# Patient Record
Sex: FEMALE | URBAN - METROPOLITAN AREA
[De-identification: names, ages, dates, MRNs, and addresses within clinical notes are randomized per-mention and may not be internally consistent; named-entity substitution may affect disease eponyms.]

---

## 2020-03-27 ENCOUNTER — EMERGENCY (EMERGENCY)
Facility: HOSPITAL | Age: 45
LOS: 1 days | End: 2020-03-27
Attending: EMERGENCY MEDICINE
Payer: SELF-PAY

## 2020-03-27 VITALS
HEIGHT: 65 IN | OXYGEN SATURATION: 100 % | SYSTOLIC BLOOD PRESSURE: 154 MMHG | RESPIRATION RATE: 18 BRPM | DIASTOLIC BLOOD PRESSURE: 87 MMHG | TEMPERATURE: 98 F | WEIGHT: 195.11 LBS | HEART RATE: 78 BPM

## 2020-03-27 PROCEDURE — L9991: CPT

## 2020-03-27 RX ORDER — SODIUM CHLORIDE 9 MG/ML
1000 INJECTION INTRAMUSCULAR; INTRAVENOUS; SUBCUTANEOUS
Refills: 0 | Status: DISCONTINUED | OUTPATIENT
Start: 2020-03-27 | End: 2020-03-31

## 2020-03-27 RX ORDER — SODIUM CHLORIDE 9 MG/ML
1000 INJECTION INTRAMUSCULAR; INTRAVENOUS; SUBCUTANEOUS ONCE
Refills: 0 | Status: DISCONTINUED | OUTPATIENT
Start: 2020-03-27 | End: 2020-03-31

## 2020-03-27 NOTE — ED ADULT TRIAGE NOTE - IDEAL BODY WEIGHT(KG)
Was the patient seen in the last year in this department? Yes Last OV 10/2018    Does patient have an active prescription for medications requested? Yes    Received Request Via: Pharmacy   57

## 2023-02-24 LAB
ALANINE AMINOTRANSFERASE (SGPT) (U/L) IN SER/PLAS: 12 U/L (ref 7–45)
ALBUMIN (G/DL) IN SER/PLAS: 3.8 G/DL (ref 3.4–5)
ALKALINE PHOSPHATASE (U/L) IN SER/PLAS: 73 U/L (ref 33–110)
ANION GAP IN SER/PLAS: 10 MMOL/L (ref 10–20)
ASPARTATE AMINOTRANSFERASE (SGOT) (U/L) IN SER/PLAS: 13 U/L (ref 9–39)
BASOPHILS (10*3/UL) IN BLOOD BY AUTOMATED COUNT: 0.05 X10E9/L (ref 0–0.1)
BASOPHILS/100 LEUKOCYTES IN BLOOD BY AUTOMATED COUNT: 0.5 % (ref 0–2)
BILIRUBIN TOTAL (MG/DL) IN SER/PLAS: 0.5 MG/DL (ref 0–1.2)
CALCIUM (MG/DL) IN SER/PLAS: 8.7 MG/DL (ref 8.6–10.3)
CARBON DIOXIDE, TOTAL (MMOL/L) IN SER/PLAS: 32 MMOL/L (ref 21–32)
CHLORIDE (MMOL/L) IN SER/PLAS: 100 MMOL/L (ref 98–107)
CHOLESTEROL (MG/DL) IN SER/PLAS: 174 MG/DL (ref 0–199)
CHOLESTEROL IN HDL (MG/DL) IN SER/PLAS: 50 MG/DL
CHOLESTEROL/HDL RATIO: 3.5
CREATININE (MG/DL) IN SER/PLAS: 0.9 MG/DL (ref 0.5–1.05)
EOSINOPHILS (10*3/UL) IN BLOOD BY AUTOMATED COUNT: 0.08 X10E9/L (ref 0–0.7)
EOSINOPHILS/100 LEUKOCYTES IN BLOOD BY AUTOMATED COUNT: 0.8 % (ref 0–6)
ERYTHROCYTE DISTRIBUTION WIDTH (RATIO) BY AUTOMATED COUNT: 16.7 % (ref 11.5–14.5)
ERYTHROCYTE MEAN CORPUSCULAR HEMOGLOBIN CONCENTRATION (G/DL) BY AUTOMATED: 29.8 G/DL (ref 32–36)
ERYTHROCYTE MEAN CORPUSCULAR VOLUME (FL) BY AUTOMATED COUNT: 84 FL (ref 80–100)
ERYTHROCYTES (10*6/UL) IN BLOOD BY AUTOMATED COUNT: 4.78 X10E12/L (ref 4–5.2)
ESTIMATED AVERAGE GLUCOSE FOR HBA1C: 120 MG/DL
GFR FEMALE: 80 ML/MIN/1.73M2
GLUCOSE (MG/DL) IN SER/PLAS: 93 MG/DL (ref 74–99)
HEMATOCRIT (%) IN BLOOD BY AUTOMATED COUNT: 40 % (ref 36–46)
HEMOGLOBIN (G/DL) IN BLOOD: 11.9 G/DL (ref 12–16)
HEMOGLOBIN A1C/HEMOGLOBIN TOTAL IN BLOOD: 5.8 %
IMMATURE GRANULOCYTES/100 LEUKOCYTES IN BLOOD BY AUTOMATED COUNT: 0.4 % (ref 0–0.9)
LDL: 106 MG/DL (ref 0–99)
LEUKOCYTES (10*3/UL) IN BLOOD BY AUTOMATED COUNT: 10.2 X10E9/L (ref 4.4–11.3)
LYMPHOCYTES (10*3/UL) IN BLOOD BY AUTOMATED COUNT: 3.91 X10E9/L (ref 1.2–4.8)
LYMPHOCYTES/100 LEUKOCYTES IN BLOOD BY AUTOMATED COUNT: 38.2 % (ref 13–44)
MONOCYTES (10*3/UL) IN BLOOD BY AUTOMATED COUNT: 0.57 X10E9/L (ref 0.1–1)
MONOCYTES/100 LEUKOCYTES IN BLOOD BY AUTOMATED COUNT: 5.6 % (ref 2–10)
NEUTROPHILS (10*3/UL) IN BLOOD BY AUTOMATED COUNT: 5.58 X10E9/L (ref 1.2–7.7)
NEUTROPHILS/100 LEUKOCYTES IN BLOOD BY AUTOMATED COUNT: 54.5 % (ref 40–80)
PLATELETS (10*3/UL) IN BLOOD AUTOMATED COUNT: 461 X10E9/L (ref 150–450)
POTASSIUM (MMOL/L) IN SER/PLAS: 3.7 MMOL/L (ref 3.5–5.3)
PROTEIN TOTAL: 7.3 G/DL (ref 6.4–8.2)
SODIUM (MMOL/L) IN SER/PLAS: 138 MMOL/L (ref 136–145)
THYROTROPIN (MIU/L) IN SER/PLAS BY DETECTION LIMIT <= 0.05 MIU/L: 1.33 MIU/L (ref 0.44–3.98)
TRIGLYCERIDE (MG/DL) IN SER/PLAS: 91 MG/DL (ref 0–149)
URATE (MG/DL) IN SER/PLAS: 5.7 MG/DL (ref 2.3–6.7)
UREA NITROGEN (MG/DL) IN SER/PLAS: 15 MG/DL (ref 6–23)
VLDL: 18 MG/DL (ref 0–40)

## 2023-03-03 PROBLEM — M25.571 ARTHRALGIA OF RIGHT FOOT: Status: ACTIVE | Noted: 2023-03-03

## 2023-03-03 PROBLEM — E66.813 CLASS 3 SEVERE OBESITY DUE TO EXCESS CALORIES WITH BODY MASS INDEX (BMI) OF 50.0 TO 59.9 IN ADULT: Status: ACTIVE | Noted: 2023-03-03

## 2023-03-03 PROBLEM — R51.9 HEADACHE: Status: ACTIVE | Noted: 2023-03-03

## 2023-03-03 PROBLEM — M72.2 PLANTAR FASCIITIS: Status: ACTIVE | Noted: 2023-03-03

## 2023-03-03 PROBLEM — R41.3 MEMORY LOSS: Status: ACTIVE | Noted: 2023-03-03

## 2023-03-03 PROBLEM — Z86.69 HISTORY OF CHIARI MALFORMATION: Status: ACTIVE | Noted: 2023-03-03

## 2023-03-03 PROBLEM — R53.83 FATIGUE: Status: ACTIVE | Noted: 2023-03-03

## 2023-03-03 PROBLEM — M77.30 HEEL SPUR: Status: ACTIVE | Noted: 2023-03-03

## 2023-03-03 PROBLEM — R60.9 EDEMA: Status: ACTIVE | Noted: 2023-03-03

## 2023-03-03 PROBLEM — R73.03 PREDIABETES: Status: ACTIVE | Noted: 2023-03-03

## 2023-03-03 PROBLEM — D64.9 ANEMIA: Status: ACTIVE | Noted: 2023-03-03

## 2023-03-03 PROBLEM — R07.89 ATYPICAL CHEST PAIN: Status: ACTIVE | Noted: 2023-03-03

## 2023-03-03 PROBLEM — R93.1 ELEVATED CORONARY ARTERY CALCIUM SCORE: Status: ACTIVE | Noted: 2023-03-03

## 2023-03-03 PROBLEM — E78.5 HYPERLIPIDEMIA LDL GOAL <70: Status: ACTIVE | Noted: 2023-03-03

## 2023-03-03 PROBLEM — F40.240 CLAUSTROPHOBIA: Status: ACTIVE | Noted: 2023-03-03

## 2023-03-03 PROBLEM — E23.7 PITUITARY ABNORMALITY (MULTI): Status: ACTIVE | Noted: 2023-03-03

## 2023-03-03 PROBLEM — R29.2 ABNORMAL REFLEXES: Status: ACTIVE | Noted: 2023-03-03

## 2023-03-03 PROBLEM — N92.0 HEAVY MENSTRUAL BLEEDING: Status: ACTIVE | Noted: 2023-03-03

## 2023-03-03 PROBLEM — E66.01 CLASS 3 SEVERE OBESITY DUE TO EXCESS CALORIES WITH BODY MASS INDEX (BMI) OF 50.0 TO 59.9 IN ADULT (MULTI): Status: ACTIVE | Noted: 2023-03-03

## 2023-03-03 RX ORDER — FUROSEMIDE 40 MG/1
TABLET ORAL
COMMUNITY
End: 2023-03-13 | Stop reason: SDUPTHER

## 2023-03-03 RX ORDER — ATORVASTATIN CALCIUM 20 MG/1
20 TABLET, FILM COATED ORAL NIGHTLY
COMMUNITY
End: 2024-03-01 | Stop reason: SDUPTHER

## 2023-03-13 DIAGNOSIS — R60.9 EDEMA, UNSPECIFIED TYPE: Primary | ICD-10-CM

## 2023-03-14 RX ORDER — FUROSEMIDE 40 MG/1
40 TABLET ORAL DAILY
Qty: 90 TABLET | Refills: 1 | Status: SHIPPED | OUTPATIENT
Start: 2023-03-14 | End: 2023-12-01

## 2023-03-17 ENCOUNTER — APPOINTMENT (OUTPATIENT)
Dept: PRIMARY CARE | Facility: CLINIC | Age: 48
End: 2023-03-17

## 2023-04-24 ENCOUNTER — APPOINTMENT (OUTPATIENT)
Dept: PRIMARY CARE | Facility: CLINIC | Age: 48
End: 2023-04-24

## 2023-05-12 ENCOUNTER — APPOINTMENT (OUTPATIENT)
Dept: PRIMARY CARE | Facility: CLINIC | Age: 48
End: 2023-05-12

## 2023-05-26 ENCOUNTER — APPOINTMENT (OUTPATIENT)
Dept: PRIMARY CARE | Facility: CLINIC | Age: 48
End: 2023-05-26

## 2023-11-28 DIAGNOSIS — R60.9 EDEMA, UNSPECIFIED TYPE: ICD-10-CM

## 2023-12-01 RX ORDER — FUROSEMIDE 40 MG/1
TABLET ORAL
Qty: 90 TABLET | Refills: 0 | Status: SHIPPED | OUTPATIENT
Start: 2023-12-01 | End: 2024-03-01 | Stop reason: SDUPTHER

## 2024-02-16 ENCOUNTER — OFFICE VISIT (OUTPATIENT)
Dept: PRIMARY CARE | Facility: CLINIC | Age: 49
End: 2024-02-16
Payer: COMMERCIAL

## 2024-02-16 VITALS
HEIGHT: 70 IN | BODY MASS INDEX: 41.95 KG/M2 | SYSTOLIC BLOOD PRESSURE: 126 MMHG | OXYGEN SATURATION: 98 % | HEART RATE: 72 BPM | DIASTOLIC BLOOD PRESSURE: 78 MMHG | WEIGHT: 293 LBS

## 2024-02-16 DIAGNOSIS — E23.7 PITUITARY ABNORMALITY (MULTI): ICD-10-CM

## 2024-02-16 DIAGNOSIS — R53.83 OTHER FATIGUE: Primary | ICD-10-CM

## 2024-02-16 DIAGNOSIS — M72.2 PLANTAR FASCIITIS: ICD-10-CM

## 2024-02-16 DIAGNOSIS — E78.5 HYPERLIPIDEMIA LDL GOAL <70: ICD-10-CM

## 2024-02-16 DIAGNOSIS — E66.01 CLASS 3 SEVERE OBESITY DUE TO EXCESS CALORIES WITH SERIOUS COMORBIDITY AND BODY MASS INDEX (BMI) OF 50.0 TO 59.9 IN ADULT (MULTI): ICD-10-CM

## 2024-02-16 DIAGNOSIS — R73.03 PREDIABETES: ICD-10-CM

## 2024-02-16 PROBLEM — F40.240 CLAUSTROPHOBIA: Status: RESOLVED | Noted: 2023-03-03 | Resolved: 2024-02-16

## 2024-02-16 PROBLEM — R41.3 MEMORY LOSS: Status: RESOLVED | Noted: 2023-03-03 | Resolved: 2024-02-16

## 2024-02-16 PROBLEM — R07.89 ATYPICAL CHEST PAIN: Status: RESOLVED | Noted: 2023-03-03 | Resolved: 2024-02-16

## 2024-02-16 PROBLEM — R51.9 HEADACHE: Status: RESOLVED | Noted: 2023-03-03 | Resolved: 2024-02-16

## 2024-02-16 PROBLEM — D64.9 ANEMIA: Status: RESOLVED | Noted: 2023-03-03 | Resolved: 2024-02-16

## 2024-02-16 PROCEDURE — 99214 OFFICE O/P EST MOD 30 MIN: CPT | Performed by: INTERNAL MEDICINE

## 2024-02-16 PROCEDURE — 3008F BODY MASS INDEX DOCD: CPT | Performed by: INTERNAL MEDICINE

## 2024-02-16 PROCEDURE — 1036F TOBACCO NON-USER: CPT | Performed by: INTERNAL MEDICINE

## 2024-02-16 RX ORDER — NAPROXEN SODIUM 220 MG/1
1 TABLET, FILM COATED ORAL DAILY
COMMUNITY
Start: 2023-05-25

## 2024-02-16 ASSESSMENT — PATIENT HEALTH QUESTIONNAIRE - PHQ9
2. FEELING DOWN, DEPRESSED OR HOPELESS: NOT AT ALL
SUM OF ALL RESPONSES TO PHQ9 QUESTIONS 1 AND 2: 0
1. LITTLE INTEREST OR PLEASURE IN DOING THINGS: NOT AT ALL

## 2024-02-16 ASSESSMENT — ENCOUNTER SYMPTOMS
COUGH: 0
NAUSEA: 0
PALPITATIONS: 0
SHORTNESS OF BREATH: 0
BACK PAIN: 0
FATIGUE: 1
BLOOD IN STOOL: 0
DIARRHEA: 0
ARTHRALGIAS: 0
WHEEZING: 0
VOMITING: 0
ABDOMINAL PAIN: 0

## 2024-02-16 NOTE — PATIENT INSTRUCTIONS
As we discussed I have ordered several labs to be done at your earliest convenience.  The lab work does involve labs that require fasting.  We typically like an 8 to 12-hour fast.  For example if you decided to go to the lab at 8 AM in the morning we would like for you to refrain from anything with calories after 8 PM the night before.  You could certainly have water and we encourage you to stay well-hydrated for your lab test.  Please read the handout I sent to you via [facility] on plantar fasciitis and when you come back we will discuss her symptoms again  Please read the handout I gave you today on obstructive sleep apnea because unfortunately you are at risk of having that condition  You can check with your insurance company regarding the new weight loss medications called GLP-1 agonists.  I only prescribe the GLP-1's that are FDA approved for weight loss.  The 2 medicines are called Wegovy and Saxenda.  There are other GLP-1 agonists that are used for diabetics and can help with weight loss but they are not FDA approved yet for weight loss  I will see you back in approximately 2 weeks

## 2024-02-16 NOTE — PROGRESS NOTES
Subjective   Patient ID: Rossi Genao is a 49 y.o. female who presents for Follow-up.  HPI  She is here today for a follow-up.  We saw her approximately 1 year ago.  Since that time she states she has been doing okay.  She does complain of chronic fatigue and we did review some of her problem list around her last visit.  She has had some issues with elevated blood sugar and cholesterol and we have decided to order lab work.  We also discussed her problems with her feet.  She has been diagnosed with plantar fasciitis and heel spur and she did see a podiatrist.  She states they trimmed her toenails but she does not really feel that they were particularly helpful with her condition.  I am giving her handout that explains plantar fasciitis and some of the things that she can do at home to help improve her condition.  I also told her that we could have her see another podiatrist for second opinion if she desires.  We also briefly discussed her history of pituitary adenoma.  She states she saw the endocrinologist but her recollection was she did not need to return unless she was having problems.  We have decided to do comprehensive lab work and I will be checking her thyroid blood work.  She states she has been feeling tired and we talked about the possibility of sleep apnea based on her risk factors.  She states she can wake up tired after sleeping but she has not been observed to be snoring and so forth.  I did give her a handout that explains sleep apnea so she can at least review some of the things that we look out for.  She also has been struggling with her weight management and we did discuss the GLP-1 agonists.  She is interested and we talked about checking with insurance to see if this would be covered.  We also talked about some of the side effects and how sometimes it can be potentially serious.  We talked about the listed increased risk of thyroid cancer and so forth.  We will see her back in  follow-up.  Review of Systems   Constitutional:  Positive for fatigue.   Respiratory:  Negative for cough, shortness of breath and wheezing.    Cardiovascular:  Positive for leg swelling. Negative for chest pain and palpitations.   Gastrointestinal:  Negative for abdominal pain, blood in stool, diarrhea, nausea and vomiting.   Musculoskeletal:  Negative for arthralgias and back pain.     Objective   Physical Exam  Vitals and nursing note reviewed.   Constitutional:       General: She is not in acute distress.     Appearance: Normal appearance.   HENT:      Head: Normocephalic and atraumatic.   Eyes:      Conjunctiva/sclera: Conjunctivae normal.   Cardiovascular:      Rate and Rhythm: Normal rate and regular rhythm.      Heart sounds: Normal heart sounds.   Pulmonary:      Effort: No respiratory distress.      Breath sounds: No wheezing.   Abdominal:      Palpations: Abdomen is soft.      Tenderness: There is no abdominal tenderness. There is no guarding.   Musculoskeletal:         General: No swelling. Normal range of motion.   Skin:     General: Skin is warm and dry.   Neurological:      General: No focal deficit present.      Mental Status: She is alert and oriented to person, place, and time.   Psychiatric:         Behavior: Behavior normal.       Assessment/Plan   Problem List Items Addressed This Visit             ICD-10-CM    Pituitary abnormality (CMS/[facility]) E23.7     -She was seen by endocrinology and we will try to track down that last progress consultation         Class 3 severe obesity due to excess calories with body mass index (BMI) of 50.0 to 59.9 in adult (CMS/[facility]) E66.01, Z68.43     -We will talk about possibly seeing a nutritionist and she does want to explore options with the GLP-1 agonist.  I will give her a list of medications to investigate with her insurance plan         Hyperlipidemia LDL goal <70 E78.5     -We will have her go for a fasting lipid profile and go over the results when she comes  back         Relevant Orders    Lipid panel    Plantar fasciitis M72.2     -She was seen and evaluated by podiatry but her condition has not improved  -I am giving her handout that explains treatment of plantar fasciitis and when she returns we can discuss a possible consultation with podiatry         Fatigue - Primary R53.83    Relevant Orders    CBC and Auto Differential    TSH    Comprehensive Metabolic Panel    Prediabetes R73.03     -We will be checking a fasting glucose as well as a hemoglobin A1c         Relevant Orders    Hemoglobin A1C          Dorys Cross, DO

## 2024-02-16 NOTE — ASSESSMENT & PLAN NOTE
-She was seen and evaluated by podiatry but her condition has not improved  -I am giving her handout that explains treatment of plantar fasciitis and when she returns we can discuss a possible consultation with podiatry

## 2024-02-16 NOTE — ASSESSMENT & PLAN NOTE
-We will talk about possibly seeing a nutritionist and she does want to explore options with the GLP-1 agonist.  I will give her a list of medications to investigate with her insurance plan

## 2024-02-17 ENCOUNTER — LAB (OUTPATIENT)
Dept: LAB | Facility: LAB | Age: 49
End: 2024-02-17
Payer: COMMERCIAL

## 2024-02-17 DIAGNOSIS — R73.03 PREDIABETES: ICD-10-CM

## 2024-02-17 DIAGNOSIS — R53.83 OTHER FATIGUE: ICD-10-CM

## 2024-02-17 DIAGNOSIS — E78.5 HYPERLIPIDEMIA LDL GOAL <70: ICD-10-CM

## 2024-02-17 LAB
ALBUMIN SERPL BCP-MCNC: 3.7 G/DL (ref 3.4–5)
ALP SERPL-CCNC: 84 U/L (ref 33–110)
ALT SERPL W P-5'-P-CCNC: 18 U/L (ref 7–45)
ANION GAP SERPL CALC-SCNC: 11 MMOL/L
AST SERPL W P-5'-P-CCNC: 20 U/L (ref 9–39)
BASOPHILS # BLD AUTO: 0.05 X10*3/UL (ref 0–0.1)
BASOPHILS NFR BLD AUTO: 0.5 %
BILIRUB SERPL-MCNC: 0.4 MG/DL (ref 0–1.2)
BUN SERPL-MCNC: 13 MG/DL (ref 6–23)
CALCIUM SERPL-MCNC: 8.7 MG/DL (ref 8.6–10.3)
CHLORIDE SERPL-SCNC: 104 MMOL/L (ref 98–107)
CHOLEST SERPL-MCNC: 123 MG/DL (ref 0–199)
CHOLESTEROL/HDL RATIO: 2.7
CO2 SERPL-SCNC: 27 MMOL/L (ref 21–32)
CREAT SERPL-MCNC: 0.88 MG/DL (ref 0.5–1.05)
EGFRCR SERPLBLD CKD-EPI 2021: 82 ML/MIN/1.73M*2
EOSINOPHIL # BLD AUTO: 0.11 X10*3/UL (ref 0–0.7)
EOSINOPHIL NFR BLD AUTO: 1.2 %
ERYTHROCYTE [DISTWIDTH] IN BLOOD BY AUTOMATED COUNT: 15.4 % (ref 11.5–14.5)
EST. AVERAGE GLUCOSE BLD GHB EST-MCNC: 117 MG/DL
GLUCOSE SERPL-MCNC: 94 MG/DL (ref 74–99)
HBA1C MFR BLD: 5.7 %
HCT VFR BLD AUTO: 40.5 % (ref 36–46)
HDLC SERPL-MCNC: 46 MG/DL
HGB BLD-MCNC: 12.6 G/DL (ref 12–16)
IMM GRANULOCYTES # BLD AUTO: 0.03 X10*3/UL (ref 0–0.7)
IMM GRANULOCYTES NFR BLD AUTO: 0.3 % (ref 0–0.9)
LDLC SERPL CALC-MCNC: 60 MG/DL
LYMPHOCYTES # BLD AUTO: 3.22 X10*3/UL (ref 1.2–4.8)
LYMPHOCYTES NFR BLD AUTO: 34.4 %
MCH RBC QN AUTO: 27.3 PG (ref 26–34)
MCHC RBC AUTO-ENTMCNC: 31.1 G/DL (ref 32–36)
MCV RBC AUTO: 88 FL (ref 80–100)
MONOCYTES # BLD AUTO: 0.52 X10*3/UL (ref 0.1–1)
MONOCYTES NFR BLD AUTO: 5.5 %
NEUTROPHILS # BLD AUTO: 5.44 X10*3/UL (ref 1.2–7.7)
NEUTROPHILS NFR BLD AUTO: 58.1 %
NON HDL CHOLESTEROL: 77 MG/DL (ref 0–149)
NRBC BLD-RTO: 0 /100 WBCS (ref 0–0)
PLATELET # BLD AUTO: 389 X10*3/UL (ref 150–450)
POTASSIUM SERPL-SCNC: 3.8 MMOL/L (ref 3.5–5.3)
PROT SERPL-MCNC: 7 G/DL (ref 6.4–8.2)
RBC # BLD AUTO: 4.61 X10*6/UL (ref 4–5.2)
SODIUM SERPL-SCNC: 138 MMOL/L (ref 136–145)
TRIGL SERPL-MCNC: 84 MG/DL (ref 0–149)
TSH SERPL-ACNC: 1.24 MIU/L (ref 0.44–3.98)
VLDL: 17 MG/DL (ref 0–40)
WBC # BLD AUTO: 9.4 X10*3/UL (ref 4.4–11.3)

## 2024-02-17 PROCEDURE — 83036 HEMOGLOBIN GLYCOSYLATED A1C: CPT

## 2024-02-17 PROCEDURE — 80061 LIPID PANEL: CPT

## 2024-02-17 PROCEDURE — 80053 COMPREHEN METABOLIC PANEL: CPT

## 2024-02-17 PROCEDURE — 85025 COMPLETE CBC W/AUTO DIFF WBC: CPT

## 2024-02-17 PROCEDURE — 36415 COLL VENOUS BLD VENIPUNCTURE: CPT

## 2024-02-17 PROCEDURE — 84443 ASSAY THYROID STIM HORMONE: CPT

## 2024-03-01 ENCOUNTER — OFFICE VISIT (OUTPATIENT)
Dept: PRIMARY CARE | Facility: CLINIC | Age: 49
End: 2024-03-01
Payer: COMMERCIAL

## 2024-03-01 VITALS
DIASTOLIC BLOOD PRESSURE: 82 MMHG | WEIGHT: 293 LBS | HEART RATE: 76 BPM | SYSTOLIC BLOOD PRESSURE: 130 MMHG | BODY MASS INDEX: 41.95 KG/M2 | HEIGHT: 70 IN | OXYGEN SATURATION: 98 %

## 2024-03-01 DIAGNOSIS — R60.9 EDEMA, UNSPECIFIED TYPE: ICD-10-CM

## 2024-03-01 DIAGNOSIS — E66.01 CLASS 3 SEVERE OBESITY DUE TO EXCESS CALORIES WITH SERIOUS COMORBIDITY AND BODY MASS INDEX (BMI) OF 50.0 TO 59.9 IN ADULT (MULTI): ICD-10-CM

## 2024-03-01 DIAGNOSIS — R53.83 OTHER FATIGUE: ICD-10-CM

## 2024-03-01 DIAGNOSIS — E78.5 HYPERLIPIDEMIA LDL GOAL <70: ICD-10-CM

## 2024-03-01 DIAGNOSIS — R73.03 PREDIABETES: Primary | ICD-10-CM

## 2024-03-01 PROCEDURE — 1036F TOBACCO NON-USER: CPT | Performed by: INTERNAL MEDICINE

## 2024-03-01 PROCEDURE — 99214 OFFICE O/P EST MOD 30 MIN: CPT | Performed by: INTERNAL MEDICINE

## 2024-03-01 PROCEDURE — 3008F BODY MASS INDEX DOCD: CPT | Performed by: INTERNAL MEDICINE

## 2024-03-01 RX ORDER — FUROSEMIDE 40 MG/1
60 TABLET ORAL DAILY
Qty: 105 TABLET | Refills: 1 | Status: SHIPPED | OUTPATIENT
Start: 2024-03-01

## 2024-03-01 RX ORDER — ATORVASTATIN CALCIUM 20 MG/1
20 TABLET, FILM COATED ORAL NIGHTLY
Qty: 90 TABLET | Refills: 1 | Status: SHIPPED | OUTPATIENT
Start: 2024-03-01

## 2024-03-01 NOTE — PATIENT INSTRUCTIONS
As we discussed I sent a new prescription to your pharmacy for furosemide also known as Lasix.  Able to read that you could take 1-1/2 of your 40 mg dose daily.  Please give me an update after taking this for a week or so to let me know how you are feeling and how your leg swelling is doing.  I have also ordered a lab test to be done in approximately 2 weeks to recheck your potassium level and kidney function.  I will contact you with the results  If you change your mind about a referral to the weight loss clinic please simply give us a call  I also sent a refill for your atorvastatin   We will schedule you for a sleep study and when the results come back I will call you

## 2024-03-01 NOTE — ASSESSMENT & PLAN NOTE
-She will go to Lasix 1-1/2 of the 40 mg dose and give me an update on her swelling and she has agreed to go to the lab in approximately 2 weeks for another BMP.  I will call her with the results

## 2024-03-01 NOTE — PROGRESS NOTES
"Subjective   Patient ID: Rossi Genao is a 49 y.o. female who presents for Follow-up.  HPI  She is here today for follow-up.  She check with her insurance plan regarding the new weight loss GLP agonist drugs and unfortunately none of them are covered well on her plan.  We discussed other avenues for helping with her weight loss journey.  She has tried weight watchers in the past and had success for some time until she \"hit the plateau and got bored \".  We talked about some ways to progress while doing that program.  We also discussed seeing a dietitian and also referral to [facility] weight loss clinic.  She states she will think about it and let me know if she decides she would like to be referred.  Otherwise she continues to struggle with some leg edema although it has improved on the Lasix.  We reviewed her most recent laboratory test results and overall her numbers are excellent.  The only thing is her hemoglobin A1c was slightly raised at 5.7 and again she knows to watch her diet and so forth.  We are going to increase her Lasix to 1-1/2 tablets daily and she will give me an update on how she is doing.  She will also go to the lab in a couple of weeks to recheck her BMP and make sure that her kidney function is okay.  We also discussed the possibility of sleep apnea again because this could be causing her fatigue and even her leg swelling.  She is agreeable to doing a sleep study and I will contact her with the results  Objective   Physical Exam  Vitals and nursing note reviewed.   Constitutional:       General: She is not in acute distress.     Appearance: Normal appearance.   HENT:      Head: Normocephalic and atraumatic.   Eyes:      Conjunctiva/sclera: Conjunctivae normal.   Cardiovascular:      Rate and Rhythm: Normal rate and regular rhythm.      Heart sounds: Normal heart sounds.   Pulmonary:      Effort: No respiratory distress.      Breath sounds: No wheezing.   Abdominal:      Palpations: " Abdomen is soft.      Tenderness: There is no abdominal tenderness. There is no guarding.   Musculoskeletal:         General: No swelling. Normal range of motion.   Skin:     General: Skin is warm and dry.   Neurological:      General: No focal deficit present.      Mental Status: She is alert and oriented to person, place, and time.   Psychiatric:         Behavior: Behavior normal.       Recent Results (from the past 672 hour(s))   CBC and Auto Differential    Collection Time: 02/17/24  8:43 AM   Result Value Ref Range    WBC 9.4 4.4 - 11.3 x10*3/uL    nRBC 0.0 0.0 - 0.0 /100 WBCs    RBC 4.61 4.00 - 5.20 x10*6/uL    Hemoglobin 12.6 12.0 - 16.0 g/dL    Hematocrit 40.5 36.0 - 46.0 %    MCV 88 80 - 100 fL    MCH 27.3 26.0 - 34.0 pg    MCHC 31.1 (L) 32.0 - 36.0 g/dL    RDW 15.4 (H) 11.5 - 14.5 %    Platelets 389 150 - 450 x10*3/uL    Neutrophils % 58.1 40.0 - 80.0 %    Immature Granulocytes %, Automated 0.3 0.0 - 0.9 %    Lymphocytes % 34.4 13.0 - 44.0 %    Monocytes % 5.5 2.0 - 10.0 %    Eosinophils % 1.2 0.0 - 6.0 %    Basophils % 0.5 0.0 - 2.0 %    Neutrophils Absolute 5.44 1.20 - 7.70 x10*3/uL    Immature Granulocytes Absolute, Automated 0.03 0.00 - 0.70 x10*3/uL    Lymphocytes Absolute 3.22 1.20 - 4.80 x10*3/uL    Monocytes Absolute 0.52 0.10 - 1.00 x10*3/uL    Eosinophils Absolute 0.11 0.00 - 0.70 x10*3/uL    Basophils Absolute 0.05 0.00 - 0.10 x10*3/uL   TSH    Collection Time: 02/17/24  8:43 AM   Result Value Ref Range    Thyroid Stimulating Hormone 1.24 0.44 - 3.98 mIU/L   Comprehensive Metabolic Panel    Collection Time: 02/17/24  8:43 AM   Result Value Ref Range    Glucose 94 74 - 99 mg/dL    Sodium 138 136 - 145 mmol/L    Potassium 3.8 3.5 - 5.3 mmol/L    Chloride 104 98 - 107 mmol/L    Bicarbonate 27 21 - 32 mmol/L    Anion Gap 11 mmol/L    Urea Nitrogen 13 6 - 23 mg/dL    Creatinine 0.88 0.50 - 1.05 mg/dL    eGFR 82 >60 mL/min/1.73m*2    Calcium 8.7 8.6 - 10.3 mg/dL    Albumin 3.7 3.4 - 5.0 g/dL     Alkaline Phosphatase 84 33 - 110 U/L    Total Protein 7.0 6.4 - 8.2 g/dL    AST 20 9 - 39 U/L    Bilirubin, Total 0.4 0.0 - 1.2 mg/dL    ALT 18 7 - 45 U/L   Hemoglobin A1C    Collection Time: 02/17/24  8:43 AM   Result Value Ref Range    Hemoglobin A1C 5.7 (H) see below %    Estimated Average Glucose 117 Not Established mg/dL   Lipid panel    Collection Time: 02/17/24  8:43 AM   Result Value Ref Range    Cholesterol 123 0 - 199 mg/dL    HDL-Cholesterol 46.0 mg/dL    Cholesterol/HDL Ratio 2.7     LDL Calculated 60 <=99 mg/dL    VLDL 17 0 - 40 mg/dL    Triglycerides 84 0 - 149 mg/dL    Non HDL Cholesterol 77 0 - 149 mg/dL       Assessment/Plan   Problem List Items Addressed This Visit             ICD-10-CM    Class 3 severe obesity due to excess calories with body mass index (BMI) of 50.0 to 59.9 in adult (CMS/[facility]) E66.01, Z68.43     -Unfortunately the new weight loss drugs are not covered on her plan  -We discussed referral to dietitian and also [facility] weight loss clinic and she will let me know if she changes her mind         Relevant Orders    In-Center Sleep Study    Hyperlipidemia LDL goal <70 E78.5     -Her cholesterol profile is excellent and she will continue with her atorvastatin 20 mg daily         Relevant Medications    atorvastatin (Lipitor) 20 mg tablet    Fatigue R53.83    Relevant Orders    In-Center Sleep Study    Edema R60.9     -She will go to Lasix 1-1/2 of the 40 mg dose and give me an update on her swelling and she has agreed to go to the lab in approximately 2 weeks for another BMP.  I will call her with the results         Relevant Medications    furosemide (Lasix) 40 mg tablet    Other Relevant Orders    Basic Metabolic Panel    Basic Metabolic Panel    Prediabetes - Primary R73.03     -Hemoglobin A1c is borderline at 5.7 and we will continue to monitor regularly         Relevant Orders    Hemoglobin A1C    In-Center Sleep Study          Dorys Cross DO

## 2024-03-01 NOTE — ASSESSMENT & PLAN NOTE
-Unfortunately the new weight loss drugs are not covered on her plan  -We discussed referral to dietitian and also [facility] weight loss clinic and she will let me know if she changes her mind

## 2024-03-11 NOTE — PROGRESS NOTES
PRIOR AUTH DONE. ROUTED TO JAMMIE WALLACE TO SCHEDULE WITH PATIENT AT [facility] SLEEP LAB.

## 2024-03-15 ENCOUNTER — LAB (OUTPATIENT)
Dept: LAB | Facility: LAB | Age: 49
End: 2024-03-15
Payer: COMMERCIAL

## 2024-03-15 DIAGNOSIS — R60.9 EDEMA, UNSPECIFIED TYPE: ICD-10-CM

## 2024-03-15 LAB
ANION GAP SERPL CALC-SCNC: 8 MMOL/L (ref 10–20)
BUN SERPL-MCNC: 14 MG/DL (ref 6–23)
CALCIUM SERPL-MCNC: 8.6 MG/DL (ref 8.6–10.3)
CHLORIDE SERPL-SCNC: 102 MMOL/L (ref 98–107)
CO2 SERPL-SCNC: 33 MMOL/L (ref 21–32)
CREAT SERPL-MCNC: 0.85 MG/DL (ref 0.5–1.05)
EGFRCR SERPLBLD CKD-EPI 2021: 85 ML/MIN/1.73M*2
GLUCOSE SERPL-MCNC: 70 MG/DL (ref 74–99)
POTASSIUM SERPL-SCNC: 3.7 MMOL/L (ref 3.5–5.3)
SODIUM SERPL-SCNC: 139 MMOL/L (ref 136–145)

## 2024-03-15 PROCEDURE — 80048 BASIC METABOLIC PNL TOTAL CA: CPT

## 2024-03-15 PROCEDURE — 36415 COLL VENOUS BLD VENIPUNCTURE: CPT

## 2024-05-03 ENCOUNTER — APPOINTMENT (OUTPATIENT)
Dept: OBSTETRICS AND GYNECOLOGY | Facility: CLINIC | Age: 49
End: 2024-05-03
Payer: COMMERCIAL

## 2024-07-05 ENCOUNTER — APPOINTMENT (OUTPATIENT)
Dept: OBSTETRICS AND GYNECOLOGY | Facility: CLINIC | Age: 49
End: 2024-07-05
Payer: COMMERCIAL

## 2024-08-09 ENCOUNTER — APPOINTMENT (OUTPATIENT)
Dept: OBSTETRICS AND GYNECOLOGY | Facility: CLINIC | Age: 49
End: 2024-08-09
Payer: COMMERCIAL

## 2024-08-09 VITALS
HEIGHT: 70 IN | SYSTOLIC BLOOD PRESSURE: 128 MMHG | DIASTOLIC BLOOD PRESSURE: 76 MMHG | WEIGHT: 293 LBS | BODY MASS INDEX: 41.95 KG/M2

## 2024-08-09 DIAGNOSIS — Z01.419 ENCOUNTER FOR GYNECOLOGICAL EXAMINATION WITHOUT ABNORMAL FINDING: ICD-10-CM

## 2024-08-09 DIAGNOSIS — Z12.31 ENCOUNTER FOR SCREENING MAMMOGRAM FOR MALIGNANT NEOPLASM OF BREAST: ICD-10-CM

## 2024-08-09 DIAGNOSIS — Z12.4 ENCOUNTER FOR SCREENING FOR CERVICAL CANCER: ICD-10-CM

## 2024-08-09 PROCEDURE — 3008F BODY MASS INDEX DOCD: CPT | Performed by: OBSTETRICS & GYNECOLOGY

## 2024-08-09 PROCEDURE — 99459 PELVIC EXAMINATION: CPT | Performed by: OBSTETRICS & GYNECOLOGY

## 2024-08-09 PROCEDURE — 99396 PREV VISIT EST AGE 40-64: CPT | Performed by: OBSTETRICS & GYNECOLOGY

## 2024-08-09 PROCEDURE — 1036F TOBACCO NON-USER: CPT | Performed by: OBSTETRICS & GYNECOLOGY

## 2024-08-09 ASSESSMENT — PAIN SCALES - GENERAL: PAINLEVEL: 0-NO PAIN

## 2024-08-09 ASSESSMENT — PATIENT HEALTH QUESTIONNAIRE - PHQ9
2. FEELING DOWN, DEPRESSED OR HOPELESS: NOT AT ALL
1. LITTLE INTEREST OR PLEASURE IN DOING THINGS: NOT AT ALL
SUM OF ALL RESPONSES TO PHQ9 QUESTIONS 1 AND 2: 0

## 2024-08-09 NOTE — PROGRESS NOTES
"Rsosi Genao is a 49 y.o. female who is here for a routine exam. PCP = Dorys Cross DO    Chief Complaint   Patient presents with    Gynecologic Exam     Patient is here for yearly exam and pap test. Patient does do regular self breast exams and has no concerns at this time. LMP: 7/20/24.          Presents for annual exam. She voices no complaints and is doing well. Denies any bowel or bladder problems. Denies any breast problems.  Uses condoms for contraception.    OB History    No obstetric history on file.          Social History     Substance and Sexual Activity   Sexual Activity Not Currently     Current contraception: Condoms    Past Medical History:   Diagnosis Date    History of Chiari malformation     Hyperlipidemia     Memory loss     Pituitary abnormality (Multi)        History reviewed. No pertinent surgical history.    Past med hx and past surg hx reviewed and notable for: none    Review of Systems:   Constitutional: No fever or chills  Respiratory: No shortness of breath, or cough  Cardiovascular: No chest pain or syncope  Breasts: No breast pain, no masses, no nipple discharge  Gastrointestinal: No nausea, vomiting, or diarrhea, no abdominal pain  Genitourinary: No dysuria or frequency  Gynecology: Negative except as noted in history of present illness  All other: All other systems reviewed and negative for complaint    Objective   /76   Ht 1.778 m (5' 10\")   Wt (!) 166 kg (365 lb)   LMP 07/20/2024 (Exact Date)   BMI 52.37 kg/m²     PHYSICAL EXAMINATION:  Chaperone present for exam:  Torrie Stewart LPN  Well-developed, well nourished, in no acute distress, alert and oriented x three, is pleasant and cooperative.   HEENT: Clear. Pupils equal, round and reactive to light and accommodation. Extraocular muscles are intact. Oral mucosa pink without exudate.   NECK: No lymphadenopathy, no thyromegaly.  BREASTS: Symmetric, no palpable masses. No nipple discharge or retraction.  LUNGS: " Clear bilaterally.  HEART: Regular rate and rhythm without murmurs.  ABDOMEN: Normoactive bowel sounds, soft and nontender, no guarding or rebound tenderness, no CVA tenderness.  EXTREMITIES: No clubbing, cyanosis or edema.  NEUROLOGIC:  Cranial nerves II-XII grossly intact.  :  Normal external female genitalia, normal vulva, normal vagina. Normal urethral meatus, urethra and bladder. Normal appearing cervix. Normal-sized uterus, no adnexal masses or tenderness. Pap smear performed today.      Actions performed during this visit include:  - Clinical breast exam  - Clinical pelvic exam  -   Orders Placed This Encounter   Procedures    BI mammo bilateral screening tomosynthesis     Standing Status:   Future     Standing Expiration Date:   9/9/2025     Order Specific Question:   Is the patient pregnant?     Answer:   No     Order Specific Question:   Reason for exam:     Answer:   Screening     Order Specific Question:   Radiologist to Determine Optimal Study     Answer:   Yes     Order Specific Question:   Release result to Thirsty     Answer:   Immediate [1]     Order Specific Question:   Is this exam part of a Research Study? If Yes, link this order to the research study     Answer:   No        Problem List Items Addressed This Visit    None  Visit Diagnoses       Encounter for screening mammogram for malignant neoplasm of breast        Relevant Orders    BI mammo bilateral screening tomosynthesis    Encounter for gynecological examination without abnormal finding        Relevant Orders    THINPREP PAP TEST    Encounter for screening for cervical cancer        Relevant Orders    THINPREP PAP TEST             Provider Impression:  1.  Annual  2.  Screening mammogram    Thank you for coming to your annual exam. Your findings during the exam were normal.  Please return for your next visit in 1 year.

## 2024-08-16 ENCOUNTER — HOSPITAL ENCOUNTER (OUTPATIENT)
Dept: RADIOLOGY | Facility: CLINIC | Age: 49
Discharge: HOME | End: 2024-08-16
Payer: COMMERCIAL

## 2024-08-16 VITALS — HEIGHT: 70 IN | BODY MASS INDEX: 41.95 KG/M2 | WEIGHT: 293 LBS

## 2024-08-16 DIAGNOSIS — Z12.31 ENCOUNTER FOR SCREENING MAMMOGRAM FOR MALIGNANT NEOPLASM OF BREAST: ICD-10-CM

## 2024-08-16 PROCEDURE — 77067 SCR MAMMO BI INCL CAD: CPT

## 2024-08-26 LAB
CYTOLOGY CMNT CVX/VAG CYTO-IMP: NORMAL
LAB AP HPV GENOTYPE QUESTION: YES
LAB AP HPV HR: NORMAL
LABORATORY COMMENT REPORT: NORMAL
LMP START DATE: NORMAL
PATH REPORT.TOTAL CANCER: NORMAL

## 2024-09-06 ENCOUNTER — LAB (OUTPATIENT)
Dept: LAB | Facility: LAB | Age: 49
End: 2024-09-06
Payer: COMMERCIAL

## 2024-09-06 DIAGNOSIS — R73.03 PREDIABETES: ICD-10-CM

## 2024-09-06 DIAGNOSIS — R60.9 EDEMA, UNSPECIFIED TYPE: ICD-10-CM

## 2024-09-06 LAB
ANION GAP SERPL CALC-SCNC: 12 MMOL/L (ref 10–20)
BUN SERPL-MCNC: 12 MG/DL (ref 6–23)
CALCIUM SERPL-MCNC: 8.8 MG/DL (ref 8.6–10.3)
CHLORIDE SERPL-SCNC: 104 MMOL/L (ref 98–107)
CO2 SERPL-SCNC: 28 MMOL/L (ref 21–32)
CREAT SERPL-MCNC: 0.83 MG/DL (ref 0.5–1.05)
EGFRCR SERPLBLD CKD-EPI 2021: 88 ML/MIN/1.73M*2
EST. AVERAGE GLUCOSE BLD GHB EST-MCNC: 108 MG/DL
GLUCOSE SERPL-MCNC: 81 MG/DL (ref 74–99)
HBA1C MFR BLD: 5.4 %
POTASSIUM SERPL-SCNC: 4.2 MMOL/L (ref 3.5–5.3)
SODIUM SERPL-SCNC: 140 MMOL/L (ref 136–145)

## 2024-09-06 PROCEDURE — 83036 HEMOGLOBIN GLYCOSYLATED A1C: CPT

## 2024-09-06 PROCEDURE — 80048 BASIC METABOLIC PNL TOTAL CA: CPT

## 2024-09-06 PROCEDURE — 36415 COLL VENOUS BLD VENIPUNCTURE: CPT

## 2024-09-13 ENCOUNTER — HOSPITAL ENCOUNTER (OUTPATIENT)
Dept: RADIOLOGY | Facility: HOSPITAL | Age: 49
Discharge: HOME | End: 2024-09-13
Payer: COMMERCIAL

## 2024-09-13 ENCOUNTER — APPOINTMENT (OUTPATIENT)
Dept: PRIMARY CARE | Facility: CLINIC | Age: 49
End: 2024-09-13
Payer: COMMERCIAL

## 2024-09-13 VITALS
HEART RATE: 69 BPM | OXYGEN SATURATION: 100 % | SYSTOLIC BLOOD PRESSURE: 118 MMHG | WEIGHT: 293 LBS | BODY MASS INDEX: 41.95 KG/M2 | DIASTOLIC BLOOD PRESSURE: 60 MMHG | HEIGHT: 70 IN

## 2024-09-13 DIAGNOSIS — M25.561 CHRONIC PAIN OF BOTH KNEES: Primary | ICD-10-CM

## 2024-09-13 DIAGNOSIS — R73.03 PREDIABETES: ICD-10-CM

## 2024-09-13 DIAGNOSIS — M25.561 CHRONIC PAIN OF BOTH KNEES: ICD-10-CM

## 2024-09-13 DIAGNOSIS — E66.01 CLASS 3 SEVERE OBESITY DUE TO EXCESS CALORIES WITH SERIOUS COMORBIDITY AND BODY MASS INDEX (BMI) OF 50.0 TO 59.9 IN ADULT (MULTI): ICD-10-CM

## 2024-09-13 DIAGNOSIS — G89.29 CHRONIC PAIN OF BOTH KNEES: Primary | ICD-10-CM

## 2024-09-13 DIAGNOSIS — G89.29 CHRONIC PAIN OF BOTH KNEES: ICD-10-CM

## 2024-09-13 DIAGNOSIS — E78.5 HYPERLIPIDEMIA LDL GOAL <70: ICD-10-CM

## 2024-09-13 DIAGNOSIS — M25.562 CHRONIC PAIN OF BOTH KNEES: Primary | ICD-10-CM

## 2024-09-13 DIAGNOSIS — R60.9 EDEMA, UNSPECIFIED TYPE: ICD-10-CM

## 2024-09-13 DIAGNOSIS — M25.562 CHRONIC PAIN OF BOTH KNEES: ICD-10-CM

## 2024-09-13 PROBLEM — M25.571 ARTHRALGIA OF RIGHT FOOT: Status: RESOLVED | Noted: 2023-03-03 | Resolved: 2024-09-13

## 2024-09-13 PROBLEM — N92.0 HEAVY MENSTRUAL BLEEDING: Status: RESOLVED | Noted: 2023-03-03 | Resolved: 2024-09-13

## 2024-09-13 PROBLEM — M77.30 HEEL SPUR: Status: RESOLVED | Noted: 2023-03-03 | Resolved: 2024-09-13

## 2024-09-13 PROBLEM — M72.2 PLANTAR FASCIITIS: Status: RESOLVED | Noted: 2023-03-03 | Resolved: 2024-09-13

## 2024-09-13 PROBLEM — R53.83 FATIGUE: Status: RESOLVED | Noted: 2023-03-03 | Resolved: 2024-09-13

## 2024-09-13 PROBLEM — R29.2 ABNORMAL REFLEXES: Status: RESOLVED | Noted: 2023-03-03 | Resolved: 2024-09-13

## 2024-09-13 PROCEDURE — 1036F TOBACCO NON-USER: CPT | Performed by: INTERNAL MEDICINE

## 2024-09-13 PROCEDURE — 99214 OFFICE O/P EST MOD 30 MIN: CPT | Performed by: INTERNAL MEDICINE

## 2024-09-13 PROCEDURE — 73560 X-RAY EXAM OF KNEE 1 OR 2: CPT | Mod: 50

## 2024-09-13 PROCEDURE — 3008F BODY MASS INDEX DOCD: CPT | Performed by: INTERNAL MEDICINE

## 2024-09-13 RX ORDER — FUROSEMIDE 40 MG/1
60 TABLET ORAL DAILY
Qty: 135 TABLET | Refills: 1 | Status: SHIPPED | OUTPATIENT
Start: 2024-09-13

## 2024-09-13 RX ORDER — NAPROXEN 500 MG/1
500 TABLET ORAL
Qty: 28 TABLET | Refills: 0 | Status: SHIPPED | OUTPATIENT
Start: 2024-09-13 | End: 2024-09-27

## 2024-09-13 RX ORDER — ATORVASTATIN CALCIUM 20 MG/1
20 TABLET, FILM COATED ORAL NIGHTLY
Qty: 90 TABLET | Refills: 1 | Status: SHIPPED | OUTPATIENT
Start: 2024-09-13

## 2024-09-13 ASSESSMENT — ENCOUNTER SYMPTOMS
FATIGUE: 0
COUGH: 0
DIARRHEA: 0
ABDOMINAL PAIN: 0
SHORTNESS OF BREATH: 0
WHEEZING: 0
PALPITATIONS: 0
VOMITING: 0
UNEXPECTED WEIGHT CHANGE: 0
CHEST TIGHTNESS: 0
NAUSEA: 0
BLOOD IN STOOL: 0
BACK PAIN: 0

## 2024-09-13 ASSESSMENT — PATIENT HEALTH QUESTIONNAIRE - PHQ9
SUM OF ALL RESPONSES TO PHQ9 QUESTIONS 1 AND 2: 0
1. LITTLE INTEREST OR PLEASURE IN DOING THINGS: NOT AT ALL
2. FEELING DOWN, DEPRESSED OR HOPELESS: NOT AT ALL

## 2024-09-13 NOTE — PATIENT INSTRUCTIONS
As we discussed I have ordered x-rays of both of your knees and you can go to the hospital after leaving the office today to get those done.  Once the results are known I will contact you  I also sent a new prescription to your pharmacy for Naprosyn and take this twice a day with food for the next 2 weeks.  Please call and let us know how you do at the end of this therapy and call sooner if you feel like you are having side effects or your knees are getting worse.  The staff will also be scheduling her to have physical therapy and if you do not get better after treatment we will send you to orthopedics  I sent refills on your medications  I would like to see you back in approximately 6 dago remember to get fasting lab work done prior to that visit

## 2024-09-13 NOTE — PROGRESS NOTES
Subjective   Patient ID: Rossi Genao is a 49 y.o. female who presents for Follow-up.  HPI  She is here today for her general checkup.  Unfortunately she has been suffering from significant knee pain.  She states they both bother her but the right one has been more problematic in recent times.  She states that during the day her knees seem okay but when she goes to rest at night the pain becomes more severe.  She did go to the emergency room on July 9 and they did a venous Doppler which thankfully came back negative.  We are reminded that she had an x-ray of her left knee back in March 2021 which showed significant arthritis.  I will have her go for x-rays of both of her knees and we talked about trying a stint of physical therapy along with an anti-inflammatory.  If she does not get better we will have her see orthopedics.  She states that her plan has been pretty restrictive and she would likely need to see a orthopedic specialist in [place].  We did conduct a review of systems and for the most part she reports feeling well.  We talked about her leg swelling and she seems to be doing okay on her Lasix dose.  We also went over the results of lab work and I am extremely pleased with her numbers.  Her hemoglobin A1c was excellent at 5.4 and her kidney function looks great.  We are providing refills on medications today.  We discussed the importance of weight loss and I have recommended she receive the flu vaccine.  Review of Systems   Constitutional:  Negative for fatigue and unexpected weight change.   Respiratory:  Negative for cough, chest tightness, shortness of breath and wheezing.    Cardiovascular:  Positive for leg swelling. Negative for chest pain and palpitations.   Gastrointestinal:  Negative for abdominal pain, blood in stool, diarrhea, nausea and vomiting.   Musculoskeletal:  Negative for back pain.     Objective   Physical Exam  Vitals and nursing note reviewed.   Constitutional:       General: She is  not in acute distress.     Appearance: Normal appearance.   HENT:      Head: Normocephalic and atraumatic.   Eyes:      Conjunctiva/sclera: Conjunctivae normal.   Cardiovascular:      Rate and Rhythm: Normal rate and regular rhythm.      Heart sounds: Normal heart sounds.   Pulmonary:      Effort: No respiratory distress.      Breath sounds: No wheezing.   Abdominal:      Palpations: Abdomen is soft.      Tenderness: There is no abdominal tenderness. There is no guarding.   Musculoskeletal:         General: No swelling. Normal range of motion.   Skin:     General: Skin is warm and dry.   Neurological:      General: No focal deficit present.      Mental Status: She is alert and oriented to person, place, and time.   Psychiatric:         Behavior: Behavior normal.       Assessment/Plan   Problem List Items Addressed This Visit             ICD-10-CM    Class 3 severe obesity due to excess calories with body mass index (BMI) of 50.0 to 59.9 in adult (Multi) E66.01, Z68.43    Hyperlipidemia LDL goal <70 E78.5    Relevant Medications    atorvastatin (Lipitor) 20 mg tablet    Other Relevant Orders    Lipid Panel    Edema R60.9     -Doing well on her current Lasix regimen and kidney and electrolyte function appear to be normal         Relevant Medications    furosemide (Lasix) 40 mg tablet    Prediabetes R73.03     -Hemoglobin A1c is excellent at 5.4 and we will check it again in 6-month         Relevant Orders    Basic Metabolic Panel    Hemoglobin A1C    Chronic pain of both knees - Primary M25.561, M25.562, G89.29     -She will go for x-rays of both of her knees and I will call her with the results  -We are placing her on Naprosyn 500 mg twice daily for the next 2 weeks  -We are referring her to physical therapy         Relevant Medications    naproxen (Naprosyn) 500 mg tablet    Other Relevant Orders    XR knee 1-2 views bilateral    Referral to Physical Therapy   PT INSTRUCTIONS  As we discussed I have ordered x-rays  of both of your knees and you can go to the hospital after leaving the office today to get those done.  Once the results are known I will contact you  I also sent a new prescription to your pharmacy for Naprosyn and take this twice a day with food for the next 2 weeks.  Please call and let us know how you do at the end of this therapy and call sooner if you feel like you are having side effects or your knees are getting worse.  The staff will also be scheduling her to have physical therapy and if you do not get better after treatment we will send you to orthopedics  I sent refills on your medications  I would like to see you back in approximately 6 dago remember to get fasting lab work done prior to that visit       Dorys Cross, DO

## 2024-09-13 NOTE — ASSESSMENT & PLAN NOTE
-She will go for x-rays of both of her knees and I will call her with the results  -We are placing her on Naprosyn 500 mg twice daily for the next 2 weeks  -We are referring her to physical therapy

## 2024-09-27 ENCOUNTER — EVALUATION (OUTPATIENT)
Dept: PHYSICAL THERAPY | Facility: CLINIC | Age: 49
End: 2024-09-27
Payer: COMMERCIAL

## 2024-09-27 DIAGNOSIS — M25.561 CHRONIC PAIN OF BOTH KNEES: ICD-10-CM

## 2024-09-27 DIAGNOSIS — M25.562 CHRONIC PAIN OF BOTH KNEES: ICD-10-CM

## 2024-09-27 DIAGNOSIS — G89.29 CHRONIC PAIN OF BOTH KNEES: ICD-10-CM

## 2024-09-27 PROCEDURE — 97161 PT EVAL LOW COMPLEX 20 MIN: CPT | Mod: GP

## 2024-09-27 ASSESSMENT — ACTIVITIES OF DAILY LIVING (ADL): EFFECT OF PAIN ON DAILY ACTIVITIES: SEE GOALS

## 2024-09-27 ASSESSMENT — ENCOUNTER SYMPTOMS
LOSS OF SENSATION IN FEET: 0
OCCASIONAL FEELINGS OF UNSTEADINESS: 0
DEPRESSION: 0

## 2024-09-27 ASSESSMENT — PAIN SCALES - GENERAL: PAINLEVEL_OUTOF10: 8

## 2024-09-27 ASSESSMENT — PAIN - FUNCTIONAL ASSESSMENT: PAIN_FUNCTIONAL_ASSESSMENT: 0-10

## 2024-09-27 NOTE — PROGRESS NOTES
Patient Name: Rossi Genao  MRN: [id number]  Today's Date: 2024  : 1975  Dorys Cross  Time Calculation  Start Time: 1001  Stop Time: 1110  Time Calculation (min): 69 min    PT Evaluation Time Entry  PT Evaluation (Low) Time Entry: 65                             Assessment:  Rehab Prognosis: Good  Barriers to Participation:  (none)  C/C sx: see pain section  NICOLAS:    see pain section  ADL makes worse?:supine or sidelyng  ADL makes better?:  Testin films show-Moderate to advanced osteoarthritis bilateral knees worst laterally.     Physical Findings: Limited:                                                 Per the patient her C/C sx of distal BLE deep aching occur exclusively when lying down to go to sleep; she is a supine or side sleeper and sx will occur in either position ( after about 30min);  Per the patient she will watch TV at times in bed (head elevated) but C/C sx occur whether she does this or not; sx will abolish if she stands up;  she does develop lumbar sx with lying at times but there is no strong association with back sx and leg sx;  the patient describes sx as a deep bony ache starting at the posterior knee radiiating down her legs to her feet   In the clinic upon lying straight supine for 20min B foot numbness began to set in; there was no accompanying ankle weakness and varying her knee position eased the sided foot numbness; per the patient she has seen a circulation specialist who stated she does have BLE circulation issues (also HX of Chiari malformation); the patient and this therapist agreed that in the absence of physical findings of tightness nor weakness of BLE it might be best to consult a circulation specialist (her 50$/visit co-pay also contributed to this decision);  further clinic visits will be held pending referral source input  POC:  Ongoing clinic PT to include:    Other: (copay?- 50 ) (fall risk?-no   ) (ins visit limit?- TBD  )  "  Plan:  Treatment/Interventions: Aquatic therapy, Cryotherapy, Education/ Instruction, Electrical stimulation, Gait training, Hot pack, Manual therapy, Self care/ home management, Therapeutic exercises, Other (comment) (IASTM/cupping)  PT Plan: Skilled PT  PT Frequency: 2 times per week  Duration: 4wks  Onset Date: 04/27/24  Certification Period Start Date: 09/27/24  Certification Period End Date: 12/26/24  Rehab Potential: Good  Plan of Care Agreement: Patient    Current Problem:   1. Chronic pain of both knees  Referral to Physical Therapy          Subjective    General:  General  Reason for Referral: B knee pain  Referred By: Royal  General Comment: 1/9 with auth  Precautions:  Precautions  STEADI Fall Risk Score (The score of 4 or more indicates an increased risk of falling): 0  Precautions Comment: Chiari malformation; edema;  BLE circulation deficits    Pain:  Pain Assessment  Pain Assessment: 0-10  0-10 (Numeric) Pain Score: 8  Pain Location: Leg (bilateral)  Pain Radiating Towards: B lower legs through to the feet  Pain Onset:  (insidious)  Effect of Pain on Daily Activities: see goals    Prior Level of Function:  Prior Function Per Pt/Caregiver Report  Vocational: Full time employment ([employer])    Objective     Outcome Measures:  Other Measures  Lower Extremity Funtional Score (LEFS): 59     Treatments:eval only-see physical findings section          Goals:  Active       PT Problem       PT Goal 1       Start:  09/27/24    Expected End:  12/26/24       Goals/treatment pending Dr referral source consult  1. Independent HEP to allow for 50% reduction in max ADL C/C sx (8/10) 2-3wks   2. 0/10 night time sx to allow for uninterrupted sleep x 1wk (4/7) 2-3wks  3. Survey score improvement from 59/80 to 65/80 (LEFS) 3-4wks             PT Goal 2       Start:  09/27/24    Expected End:  12/26/24       1. \"I want my  lower legs  to feel better\".              KNEE    Knee Functional Rating Scale  Other: " negative myotome weakness BLE    Knee Observation  Observation Comment: upon lying straight supine for 20min B foot numbness began to set in; there was no accompanying ankle weakness and varying her knee position eased the sided foot numbness         Knee AROM WFL unless documented below  Knee AROM WFL: yes  Lower Extremity Strength:WFL unless documented  MMT 5/5 max  RIGHT LEFT   Hip Flexion    5 /5    5 /5   Hip Extension    5 /5    5 /5   Hip Abduction     5/5    5 /5        Knee Extension    5 /5    5 /5   Knee Flexion    5 /5     5/5

## 2024-10-30 ENCOUNTER — DOCUMENTATION (OUTPATIENT)
Dept: PHYSICAL THERAPY | Facility: CLINIC | Age: 49
End: 2024-10-30
Payer: COMMERCIAL

## 2024-11-23 ENCOUNTER — HOSPITAL ENCOUNTER (EMERGENCY)
Facility: HOSPITAL | Age: 49
Discharge: HOME | End: 2024-11-23
Attending: EMERGENCY MEDICINE
Payer: COMMERCIAL

## 2024-11-23 ENCOUNTER — APPOINTMENT (OUTPATIENT)
Dept: RADIOLOGY | Facility: HOSPITAL | Age: 49
End: 2024-11-23
Payer: COMMERCIAL

## 2024-11-23 VITALS
BODY MASS INDEX: 41.95 KG/M2 | HEART RATE: 75 BPM | OXYGEN SATURATION: 98 % | TEMPERATURE: 97.1 F | DIASTOLIC BLOOD PRESSURE: 74 MMHG | WEIGHT: 293 LBS | SYSTOLIC BLOOD PRESSURE: 117 MMHG | HEIGHT: 70 IN | RESPIRATION RATE: 20 BRPM

## 2024-11-23 DIAGNOSIS — M54.6 ACUTE MIDLINE THORACIC BACK PAIN: Primary | ICD-10-CM

## 2024-11-23 LAB
APPEARANCE UR: CLEAR
BILIRUB UR STRIP.AUTO-MCNC: NEGATIVE MG/DL
COLOR UR: ABNORMAL
GLUCOSE UR STRIP.AUTO-MCNC: NORMAL MG/DL
HOLD SPECIMEN: NORMAL
KETONES UR STRIP.AUTO-MCNC: NEGATIVE MG/DL
LEUKOCYTE ESTERASE UR QL STRIP.AUTO: NEGATIVE
MUCOUS THREADS #/AREA URNS AUTO: ABNORMAL /LPF
NITRITE UR QL STRIP.AUTO: NEGATIVE
PH UR STRIP.AUTO: 7.5 [PH]
PROT UR STRIP.AUTO-MCNC: ABNORMAL MG/DL
RBC # UR STRIP.AUTO: ABNORMAL /UL
RBC #/AREA URNS AUTO: >20 /HPF
SP GR UR STRIP.AUTO: 1.02
SQUAMOUS #/AREA URNS AUTO: ABNORMAL /HPF
UROBILINOGEN UR STRIP.AUTO-MCNC: NORMAL MG/DL
WBC #/AREA URNS AUTO: ABNORMAL /HPF

## 2024-11-23 PROCEDURE — 81001 URINALYSIS AUTO W/SCOPE: CPT | Performed by: EMERGENCY MEDICINE

## 2024-11-23 PROCEDURE — 72128 CT CHEST SPINE W/O DYE: CPT | Performed by: RADIOLOGY

## 2024-11-23 PROCEDURE — 96372 THER/PROPH/DIAG INJ SC/IM: CPT | Performed by: EMERGENCY MEDICINE

## 2024-11-23 PROCEDURE — 99284 EMERGENCY DEPT VISIT MOD MDM: CPT | Mod: 25

## 2024-11-23 PROCEDURE — 72128 CT CHEST SPINE W/O DYE: CPT

## 2024-11-23 PROCEDURE — 2500000004 HC RX 250 GENERAL PHARMACY W/ HCPCS (ALT 636 FOR OP/ED): Performed by: EMERGENCY MEDICINE

## 2024-11-23 RX ORDER — HYDROCODONE BITARTRATE AND ACETAMINOPHEN 5; 325 MG/1; MG/1
1 TABLET ORAL EVERY 6 HOURS PRN
Qty: 12 TABLET | Refills: 0 | Status: SHIPPED | OUTPATIENT
Start: 2024-11-23 | End: 2024-11-26

## 2024-11-23 RX ORDER — ORPHENADRINE CITRATE 30 MG/ML
60 INJECTION INTRAMUSCULAR; INTRAVENOUS ONCE
Status: COMPLETED | OUTPATIENT
Start: 2024-11-23 | End: 2024-11-23

## 2024-11-23 RX ORDER — NAPROXEN 500 MG/1
500 TABLET ORAL
Qty: 14 TABLET | Refills: 0 | Status: SHIPPED | OUTPATIENT
Start: 2024-11-23 | End: 2024-11-30

## 2024-11-23 RX ORDER — KETOROLAC TROMETHAMINE 30 MG/ML
30 INJECTION, SOLUTION INTRAMUSCULAR; INTRAVENOUS ONCE
Status: COMPLETED | OUTPATIENT
Start: 2024-11-23 | End: 2024-11-23

## 2024-11-23 RX ORDER — MORPHINE SULFATE 4 MG/ML
4 INJECTION, SOLUTION INTRAMUSCULAR; INTRAVENOUS ONCE
Status: COMPLETED | OUTPATIENT
Start: 2024-11-23 | End: 2024-11-23

## 2024-11-23 RX ORDER — CYCLOBENZAPRINE HCL 10 MG
10 TABLET ORAL 3 TIMES DAILY PRN
Qty: 21 TABLET | Refills: 0 | Status: SHIPPED | OUTPATIENT
Start: 2024-11-23 | End: 2024-11-30

## 2024-11-23 RX ADMIN — KETOROLAC TROMETHAMINE 30 MG: 30 INJECTION, SOLUTION INTRAMUSCULAR at 09:40

## 2024-11-23 RX ADMIN — MORPHINE SULFATE 4 MG: 4 INJECTION, SOLUTION INTRAMUSCULAR; INTRAVENOUS at 09:39

## 2024-11-23 RX ADMIN — ORPHENADRINE CITRATE 60 MG: 60 INJECTION INTRAMUSCULAR; INTRAVENOUS at 09:39

## 2024-11-23 ASSESSMENT — PAIN DESCRIPTION - LOCATION
LOCATION: BACK
LOCATION: BACK

## 2024-11-23 ASSESSMENT — PAIN SCALES - GENERAL
PAINLEVEL_OUTOF10: 6
PAINLEVEL_OUTOF10: 4
PAINLEVEL_OUTOF10: 7

## 2024-11-23 ASSESSMENT — COLUMBIA-SUICIDE SEVERITY RATING SCALE - C-SSRS
2. HAVE YOU ACTUALLY HAD ANY THOUGHTS OF KILLING YOURSELF?: NO
6. HAVE YOU EVER DONE ANYTHING, STARTED TO DO ANYTHING, OR PREPARED TO DO ANYTHING TO END YOUR LIFE?: NO
1. IN THE PAST MONTH, HAVE YOU WISHED YOU WERE DEAD OR WISHED YOU COULD GO TO SLEEP AND NOT WAKE UP?: NO

## 2024-11-23 ASSESSMENT — VISUAL ACUITY: OU: 1

## 2024-11-23 ASSESSMENT — PAIN - FUNCTIONAL ASSESSMENT: PAIN_FUNCTIONAL_ASSESSMENT: 0-10

## 2024-11-23 NOTE — DISCHARGE INSTRUCTIONS
CT scan of your thoracic spine reveals evidence of a 1.5 prominence at the T4-T5 level that will need to be monitored to make sure there is no evidence of cancer.  The CT scan reveals evidence of arthritis.

## 2024-11-23 NOTE — ED PROVIDER NOTES
Back pain.  This 49-year-old white female with history of morbid obesity presents to the ED with complaint of lower midline thoracic back pain.  Patient states his symptoms started a few days ago and she does not know of any known injury to her back.  She states that over the weekend she did put a dresser together but she does not remember any specific injury at that time.  She denies any urinary symptoms or previous history of kidney stones.  She states that the pain was more gradual in onset and just has progressively gotten worse she has been using ice and ibuprofen at home without any improvement of her symptoms.  She denies any signs or symptoms concerning for cauda equina.  She states that she has had back pain issues previously.      History provided by:  Patient   used: No         Physical Exam  Vitals and nursing note reviewed.   Constitutional:       General: She is awake.      Appearance: Normal appearance. She is morbidly obese.   HENT:      Head: Normocephalic and atraumatic.      Right Ear: Hearing and external ear normal.      Left Ear: Hearing and external ear normal.      Nose: Nose normal. No congestion or rhinorrhea.      Mouth/Throat:      Lips: Pink.      Mouth: Mucous membranes are moist.      Pharynx: Oropharynx is clear. Uvula midline. No oropharyngeal exudate or posterior oropharyngeal erythema.   Eyes:      General: Lids are normal. Vision grossly intact.         Right eye: No discharge.         Left eye: No discharge.      Extraocular Movements: Extraocular movements intact.      Conjunctiva/sclera: Conjunctivae normal.      Pupils: Pupils are equal, round, and reactive to light.   Cardiovascular:      Rate and Rhythm: Normal rate and regular rhythm.      Pulses: Normal pulses.      Heart sounds: Normal heart sounds. No murmur heard.     No friction rub. No gallop.   Pulmonary:      Effort: Pulmonary effort is normal. No respiratory distress.      Breath sounds: Normal  breath sounds. No stridor. No wheezing, rhonchi or rales.   Chest:      Chest wall: No tenderness.   Abdominal:      General: Abdomen is protuberant. Bowel sounds are normal. There is no distension.      Palpations: Abdomen is soft. There is no mass.      Tenderness: There is no abdominal tenderness. There is no guarding or rebound.      Hernia: No hernia is present.      Comments: Patient has a benign abdominal exam.   Musculoskeletal:         General: No swelling, deformity or signs of injury. Normal range of motion.      Cervical back: Normal, full passive range of motion without pain, normal range of motion and neck supple.      Thoracic back: Tenderness and bony tenderness present.      Lumbar back: Normal.        Back:       Right lower leg: No edema.      Left lower leg: No edema.   Skin:     General: Skin is warm and dry.      Capillary Refill: Capillary refill takes less than 2 seconds.      Coloration: Skin is not jaundiced or pale.      Findings: No bruising, erythema, lesion or rash.   Neurological:      General: No focal deficit present.      Mental Status: She is alert and oriented to person, place, and time.      GCS: GCS eye subscore is 4. GCS verbal subscore is 5. GCS motor subscore is 6.      Cranial Nerves: Cranial nerves 2-12 are intact. No cranial nerve deficit.      Sensory: Sensation is intact. No sensory deficit.      Motor: Motor function is intact. No weakness.      Coordination: Coordination is intact. Coordination normal.      Gait: Gait is intact.      Deep Tendon Reflexes: Reflexes normal.      Comments: Patient has +5/5 EHLs bilaterally.  Patient is noted be dyspneic with ambulation due to body habitus.   Psychiatric:         Attention and Perception: Attention and perception normal.         Mood and Affect: Mood and affect normal.         Speech: Speech normal.         Behavior: Behavior normal. Behavior is cooperative.         Thought Content: Thought content normal.          Cognition and Memory: Cognition and memory normal.         Judgment: Judgment normal.          Labs Reviewed   URINALYSIS WITH REFLEX CULTURE AND MICROSCOPIC - Abnormal       Result Value    Color, Urine Light-Yellow      Appearance, Urine Clear      Specific Gravity, Urine 1.022      pH, Urine 7.5      Protein, Urine 10 (TRACE)      Glucose, Urine Normal      Blood, Urine 0.03 (TRACE) (*)     Ketones, Urine NEGATIVE      Bilirubin, Urine NEGATIVE      Urobilinogen, Urine Normal      Nitrite, Urine NEGATIVE      Leukocyte Esterase, Urine NEGATIVE     URINALYSIS MICROSCOPIC WITH REFLEX CULTURE - Abnormal    WBC, Urine 1-5      RBC, Urine >20 (*)     Squamous Epithelial Cells, Urine 1-9 (SPARSE)      Mucus, Urine 1+     URINALYSIS WITH REFLEX CULTURE AND MICROSCOPIC    Narrative:     The following orders were created for panel order Urinalysis with Reflex Culture and Microscopic.  Procedure                               Abnormality         Status                     ---------                               -----------         ------                     Urinalysis with Reflex C...[[id number]]  Abnormal            Final result               Extra Urine Gray Tube[[id number]]                            In process                   Please view results for these tests on the individual orders.   EXTRA URINE GRAY TUBE        CT thoracic spine wo IV contrast   Final Result   Mild-to-moderate multilevel spondylosis.   Paraspinal right anterior paramedian 1.5 cm prominence as described.        Signed by: Yuval Sen 11/23/2024 10:18 AM   Dictation workstation:   [id number]           Procedures     Medical Decision Making  Patient was seen and evaluated in the ED due to complaints of mid thoracic back pain that began approxi-2 days prior to arrival to the ED.  She states that she does not know of any injury to her back.  She states that symptoms were gradual onset and have gotten worse since then.  She has been treating her self  with ibuprofen and ice at home without relief.  She denies any signs or symptoms concerning for cauda equina.  Patient clinically had focal tenderness of thoracic spine in the T4-5 area.  Patient had no CVA tenderness or worsening discomfort with percussion.  Patient was ordered a CT scan of the thoracic spine as well as a urinalysis.  Patient was also ordered IM Toradol, IM Norflex and IM morphine.  Patient did get some relief of her pain.  CT scan imaging of thoracic spine revealed arthritic changes as well as a 1.5 cm prominence at the T4-T5 level with a white differential including that of malignancy I did have a detailed discussion with the patient concerning the CT scan results and need for follow-up with her primary care doctor to further monitor this abnormality noted on CT scan.  Urinalysis was contaminated with hematuria.  Without gross evidence of infection.  I told the patient that if the urine culture grew anything that she would be contacted and antibiotic.  Patient was provided prescriptions for Flexeril, Naprosyn and Norco after an [facility] report was performed.  She was then discharged home in stable satisfactory condition.         Diagnoses as of 11/23/24 1120   Acute midline thoracic back pain                    Augie Patiño, DO  11/23/24 1120

## 2024-12-06 ENCOUNTER — APPOINTMENT (OUTPATIENT)
Age: 49
End: 2024-12-06
Payer: COMMERCIAL

## 2024-12-06 VITALS
HEART RATE: 81 BPM | HEIGHT: 70 IN | OXYGEN SATURATION: 97 % | DIASTOLIC BLOOD PRESSURE: 86 MMHG | SYSTOLIC BLOOD PRESSURE: 136 MMHG | WEIGHT: 293 LBS | BODY MASS INDEX: 41.95 KG/M2

## 2024-12-06 DIAGNOSIS — M25.561 CHRONIC PAIN OF BOTH KNEES: ICD-10-CM

## 2024-12-06 DIAGNOSIS — R53.83 OTHER FATIGUE: ICD-10-CM

## 2024-12-06 DIAGNOSIS — M25.562 CHRONIC PAIN OF BOTH KNEES: ICD-10-CM

## 2024-12-06 DIAGNOSIS — G89.29 CHRONIC PAIN OF BOTH KNEES: ICD-10-CM

## 2024-12-06 DIAGNOSIS — M54.6 ACUTE MIDLINE THORACIC BACK PAIN: ICD-10-CM

## 2024-12-06 DIAGNOSIS — M79.89 PARASPINAL SOFT TISSUE MASS: Primary | ICD-10-CM

## 2024-12-06 PROCEDURE — 1036F TOBACCO NON-USER: CPT | Performed by: INTERNAL MEDICINE

## 2024-12-06 PROCEDURE — 99214 OFFICE O/P EST MOD 30 MIN: CPT | Performed by: INTERNAL MEDICINE

## 2024-12-06 PROCEDURE — 3008F BODY MASS INDEX DOCD: CPT | Performed by: INTERNAL MEDICINE

## 2024-12-06 RX ORDER — CYCLOBENZAPRINE HCL 10 MG
10 TABLET ORAL 3 TIMES DAILY PRN
Qty: 21 TABLET | Refills: 0 | Status: SHIPPED | OUTPATIENT
Start: 2024-12-06 | End: 2024-12-13

## 2024-12-06 ASSESSMENT — ENCOUNTER SYMPTOMS
NAUSEA: 0
COUGH: 0
SHORTNESS OF BREATH: 0
BLOOD IN STOOL: 0
VOMITING: 0
PALPITATIONS: 0
DIARRHEA: 0
WHEEZING: 0
FATIGUE: 1
ABDOMINAL PAIN: 0
BACK PAIN: 1

## 2024-12-06 NOTE — PROGRESS NOTES
Subjective   Patient ID: Rossi Genao is a 49 y.o. female who presents for Follow-up (ER follow up from 11/23, midline back pain).  HPI  She is here today for follow-up.  When we saw her last time she was having some pain in the knees with some distal radiation.  We did x-rays and then proceeded to refer her to physical therapy to see if this might help.  She states despite partaking in the therapy her knees really did not get better and we talked about the possibility of a neurologic entity.  She ended up going to the emergency room on November 23 and there she had a CT scan of her thoracic spine because of severe pain.  The radiologist commented that there is anterior right paramedian circumscribed 1.5 cm prominence at  the T4-5 level best seen on axial image 149 of series 8., possibly  soft tissue but of relative low attenuation, and therefore may be  cystic. This is nonspecific and may be due to extramedullary  hematopoiesis, lymphocele, neuroma etc., with malignancy not excluded  as there are no prior exams for comparison. Follow-up to exclude  interval change would be recommended.  She states she has been feeling tired and on occasion she gets numbness and tingling in her arms and legs.  It is not constant.  She has had no problems with balance.  She states that her entire spine hurts.  We have decided to request an MRI of the region in question and I told her that hopefully this gets approved and we will see her back after it is completed.  I am also going to get some lab work and again we will see her back after completion of her studies.  I was able to find a record regarding an MRI of her cervical spine back on March 12, 2016 and at that time nothing of a serious nature was identified.  She has been diagnosed with a pituitary lesion and she states that years ago she saw the neurologist but they were not impressed that anything of a serious nature was going on.  Review of Systems   Constitutional:   Positive for fatigue.   Respiratory:  Negative for cough, shortness of breath and wheezing.    Cardiovascular:  Negative for chest pain, palpitations and leg swelling.   Gastrointestinal:  Negative for abdominal pain, blood in stool, diarrhea, nausea and vomiting.   Musculoskeletal:  Positive for back pain.     Objective   Physical Exam  Vitals and nursing note reviewed.   Constitutional:       General: She is not in acute distress.     Appearance: Normal appearance.   HENT:      Head: Normocephalic and atraumatic.   Eyes:      Conjunctiva/sclera: Conjunctivae normal.   Cardiovascular:      Rate and Rhythm: Normal rate and regular rhythm.      Heart sounds: Normal heart sounds.   Pulmonary:      Effort: No respiratory distress.      Breath sounds: No wheezing.   Abdominal:      Palpations: Abdomen is soft.      Tenderness: There is no abdominal tenderness. There is no guarding.   Musculoskeletal:         General: No swelling. Normal range of motion.   Skin:     General: Skin is warm and dry.   Neurological:      General: No focal deficit present.      Mental Status: She is alert and oriented to person, place, and time.   Psychiatric:         Behavior: Behavior normal.       Assessment/Plan   Problem List Items Addressed This Visit             ICD-10-CM    Other fatigue R53.83    Relevant Orders    CBC and Auto Differential    Comprehensive Metabolic Panel    Sedimentation Rate    Chronic pain of both knees M25.561, M25.562, G89.29     -She had x-rays of her knees on September 13 which reveals moderate to advanced degenerative disease .  She underwent physical therapy but did not feel this was particularly beneficial.         Acute midline thoracic back pain M54.6    Relevant Medications    cyclobenzaprine (Flexeril) 10 mg tablet    Other Relevant Orders    MR thoracic spine wo IV contrast    Paraspinal soft tissue mass - Primary M79.89     -CT scan of the thoracic spine revealed a lesion at the T4-T5 region and  therefore I am requesting an MRI as follow-up.  She will also get lab work and we will see her back in follow-up         Relevant Orders    MR thoracic spine wo IV contrast    CBC and Auto Differential    Comprehensive Metabolic Panel    Sedimentation Rate          Dorys Cross, DOPatient ID: Rossihermilo Genao is a 49 y.o. female.    Procedures

## 2024-12-06 NOTE — ASSESSMENT & PLAN NOTE
-She had x-rays of her knees on September 13 which reveals moderate to advanced degenerative disease .  She underwent physical therapy but did not feel this was particularly beneficial.

## 2024-12-06 NOTE — ASSESSMENT & PLAN NOTE
-CT scan of the thoracic spine revealed a lesion at the T4-T5 region and therefore I am requesting an MRI as follow-up.  She will also get lab work and we will see her back in follow-up

## 2024-12-20 ENCOUNTER — LAB (OUTPATIENT)
Dept: LAB | Facility: LAB | Age: 49
End: 2024-12-20
Payer: COMMERCIAL

## 2024-12-20 ENCOUNTER — HOSPITAL ENCOUNTER (OUTPATIENT)
Dept: RADIOLOGY | Facility: HOSPITAL | Age: 49
Discharge: HOME | End: 2024-12-20
Payer: COMMERCIAL

## 2024-12-20 DIAGNOSIS — M79.89 PARASPINAL SOFT TISSUE MASS: ICD-10-CM

## 2024-12-20 DIAGNOSIS — M54.6 ACUTE MIDLINE THORACIC BACK PAIN: ICD-10-CM

## 2024-12-20 DIAGNOSIS — R53.83 OTHER FATIGUE: ICD-10-CM

## 2024-12-20 LAB
ALBUMIN SERPL BCP-MCNC: 3.8 G/DL (ref 3.4–5)
ALP SERPL-CCNC: 72 U/L (ref 33–110)
ALT SERPL W P-5'-P-CCNC: 14 U/L (ref 7–45)
ANION GAP SERPL CALC-SCNC: 10 MMOL/L (ref 10–20)
AST SERPL W P-5'-P-CCNC: 15 U/L (ref 9–39)
BASOPHILS # BLD AUTO: 0.06 X10*3/UL (ref 0–0.1)
BASOPHILS NFR BLD AUTO: 0.6 %
BILIRUB SERPL-MCNC: 0.4 MG/DL (ref 0–1.2)
BUN SERPL-MCNC: 11 MG/DL (ref 6–23)
CALCIUM SERPL-MCNC: 8.7 MG/DL (ref 8.6–10.3)
CHLORIDE SERPL-SCNC: 102 MMOL/L (ref 98–107)
CO2 SERPL-SCNC: 31 MMOL/L (ref 21–32)
CREAT SERPL-MCNC: 0.81 MG/DL (ref 0.5–1.05)
EGFRCR SERPLBLD CKD-EPI 2021: 90 ML/MIN/1.73M*2
EOSINOPHIL # BLD AUTO: 0.14 X10*3/UL (ref 0–0.7)
EOSINOPHIL NFR BLD AUTO: 1.4 %
ERYTHROCYTE [DISTWIDTH] IN BLOOD BY AUTOMATED COUNT: 14 % (ref 11.5–14.5)
ERYTHROCYTE [SEDIMENTATION RATE] IN BLOOD BY WESTERGREN METHOD: 21 MM/H (ref 0–20)
GLUCOSE SERPL-MCNC: 88 MG/DL (ref 74–99)
HCT VFR BLD AUTO: 43.7 % (ref 36–46)
HGB BLD-MCNC: 13.6 G/DL (ref 12–16)
IMM GRANULOCYTES # BLD AUTO: 0.04 X10*3/UL (ref 0–0.7)
IMM GRANULOCYTES NFR BLD AUTO: 0.4 % (ref 0–0.9)
LYMPHOCYTES # BLD AUTO: 2.78 X10*3/UL (ref 1.2–4.8)
LYMPHOCYTES NFR BLD AUTO: 27.4 %
MCH RBC QN AUTO: 28 PG (ref 26–34)
MCHC RBC AUTO-ENTMCNC: 31.1 G/DL (ref 32–36)
MCV RBC AUTO: 90 FL (ref 80–100)
MONOCYTES # BLD AUTO: 0.61 X10*3/UL (ref 0.1–1)
MONOCYTES NFR BLD AUTO: 6 %
NEUTROPHILS # BLD AUTO: 6.5 X10*3/UL (ref 1.2–7.7)
NEUTROPHILS NFR BLD AUTO: 64.2 %
NRBC BLD-RTO: 0 /100 WBCS (ref 0–0)
PLATELET # BLD AUTO: 409 X10*3/UL (ref 150–450)
POTASSIUM SERPL-SCNC: 4.3 MMOL/L (ref 3.5–5.3)
PROT SERPL-MCNC: 6.6 G/DL (ref 6.4–8.2)
RBC # BLD AUTO: 4.85 X10*6/UL (ref 4–5.2)
SODIUM SERPL-SCNC: 139 MMOL/L (ref 136–145)
WBC # BLD AUTO: 10.1 X10*3/UL (ref 4.4–11.3)

## 2024-12-20 PROCEDURE — 2550000001 HC RX 255 CONTRASTS: Performed by: INTERNAL MEDICINE

## 2024-12-20 PROCEDURE — 36415 COLL VENOUS BLD VENIPUNCTURE: CPT

## 2024-12-20 PROCEDURE — 80053 COMPREHEN METABOLIC PANEL: CPT

## 2024-12-20 PROCEDURE — 85652 RBC SED RATE AUTOMATED: CPT

## 2024-12-20 PROCEDURE — A9575 INJ GADOTERATE MEGLUMI 0.1ML: HCPCS | Performed by: INTERNAL MEDICINE

## 2024-12-20 PROCEDURE — 85025 COMPLETE CBC W/AUTO DIFF WBC: CPT

## 2024-12-20 PROCEDURE — 72157 MRI CHEST SPINE W/O & W/DYE: CPT

## 2024-12-20 RX ORDER — GADOTERATE MEGLUMINE 376.9 MG/ML
30 INJECTION INTRAVENOUS
Status: COMPLETED | OUTPATIENT
Start: 2024-12-20 | End: 2024-12-20

## 2024-12-26 ENCOUNTER — APPOINTMENT (OUTPATIENT)
Age: 49
End: 2024-12-26
Payer: COMMERCIAL

## 2024-12-26 VITALS
OXYGEN SATURATION: 99 % | HEART RATE: 91 BPM | WEIGHT: 293 LBS | SYSTOLIC BLOOD PRESSURE: 122 MMHG | BODY MASS INDEX: 41.95 KG/M2 | DIASTOLIC BLOOD PRESSURE: 88 MMHG | HEIGHT: 70 IN

## 2024-12-26 DIAGNOSIS — R73.03 PREDIABETES: ICD-10-CM

## 2024-12-26 DIAGNOSIS — E78.5 HYPERLIPIDEMIA LDL GOAL <70: Primary | ICD-10-CM

## 2024-12-26 DIAGNOSIS — M54.6 ACUTE MIDLINE THORACIC BACK PAIN: ICD-10-CM

## 2024-12-26 PROCEDURE — 99213 OFFICE O/P EST LOW 20 MIN: CPT | Performed by: INTERNAL MEDICINE

## 2024-12-26 PROCEDURE — 1036F TOBACCO NON-USER: CPT | Performed by: INTERNAL MEDICINE

## 2024-12-26 PROCEDURE — 3008F BODY MASS INDEX DOCD: CPT | Performed by: INTERNAL MEDICINE

## 2024-12-26 RX ORDER — CYCLOBENZAPRINE HCL 10 MG
10 TABLET ORAL 3 TIMES DAILY PRN
Qty: 21 TABLET | Refills: 0 | Status: SHIPPED | OUTPATIENT
Start: 2024-12-26 | End: 2025-01-02

## 2024-12-26 RX ORDER — ATORVASTATIN CALCIUM 20 MG/1
20 TABLET, FILM COATED ORAL NIGHTLY
Qty: 100 TABLET | Refills: 1 | Status: SHIPPED | OUTPATIENT
Start: 2024-12-26

## 2024-12-26 NOTE — PROGRESS NOTES
Subjective   Patient ID: Rossi Genao is a 49 y.o. female who presents for Follow-up (Mri results ).  HPI  She is here today for follow-up regarding her back pain.  We are reminded that she had a CT scan done in the emergency room and they found an incidental finding in the thoracic spine.  It was suggested she have a follow-up MRI and she is here today to go over the results.  The radiologist comments that the lesion within the right anterior paraspinous soft tissues at the T5 level appears to be cystic. It is nonspecific. Given prominent adjacent osteophytic spurring it is conceivable it could be  degenerative in etiology.  They also see a small disc bulges or protrusions at T10-11 and T11-12 do not significantly narrow the spinal canal.  She has completed physical therapy but unfortunately she states her pain is no better.  When she leans over or bends over to  something she experiences pain on the level of 7 and while sitting she has a pain level of 0.  I will be referring her to the pain clinic for further assessment and treatment and she is agreeable.  Objective   Physical Exam  Vitals and nursing note reviewed.   Constitutional:       General: She is not in acute distress.     Appearance: Normal appearance.   HENT:      Head: Normocephalic and atraumatic.   Eyes:      Conjunctiva/sclera: Conjunctivae normal.   Cardiovascular:      Rate and Rhythm: Normal rate and regular rhythm.      Heart sounds: Normal heart sounds.   Pulmonary:      Effort: No respiratory distress.      Breath sounds: No wheezing.   Abdominal:      Palpations: Abdomen is soft.      Tenderness: There is no abdominal tenderness. There is no guarding.   Musculoskeletal:         General: No swelling. Normal range of motion.   Skin:     General: Skin is warm and dry.   Neurological:      General: No focal deficit present.      Mental Status: She is alert and oriented to person, place, and time.   Psychiatric:         Behavior: Behavior  normal.       Assessment/Plan   Problem List Items Addressed This Visit             ICD-10-CM    Hyperlipidemia LDL goal <70 - Primary E78.5    Relevant Medications    atorvastatin (Lipitor) 20 mg tablet    Other Relevant Orders    Basic Metabolic Panel    Lipid Panel    Prediabetes R73.03    Relevant Orders    Hemoglobin A1C    Acute midline thoracic back pain M54.6     -Pain has been present for a couple of months  -Recent CT scan result in the emergency department revealed an abnormal lesion in the thoracic spine  -Follow-up MRI revealed  lesion within the right anterior paraspinous soft tissues at the T5 level appears to be cystic. It is nonspecific. Given prominent adjacent osteophytic spurring it is conceivable it could be  degenerative in etiology.  They also see a small disc bulges or protrusions at T10-11 and T11-12 do not significantly narrow the spinal canal.    -She has completed physical therapy without much effect  -I am referring her to the pain clinic for further assessment and treatment         Relevant Medications    cyclobenzaprine (Flexeril) 10 mg tablet    Other Relevant Orders    Referral to Pain Medicine   Patient instructions  As we discussed the staff will be contacting you about getting an appointment at our pain clinic.  I am asking them to address the pain you have in your thoracic spine.  Please be sure to mention to your provider that you had an MRI and I am sure that they will want to review that report.  They will see that you have a cyst and they can also help comment on whether this might be contributing to your symptoms or not and whether it needs to be monitored periodically.  Please call if things or not going according to plan  Otherwise we will see you back in 6 months and please remember to get fasting lab work done just prior to your next follow-up visit.       Dorys Cross, DO

## 2024-12-26 NOTE — ASSESSMENT & PLAN NOTE
-Pain has been present for a couple of months  -Recent CT scan result in the emergency department revealed an abnormal lesion in the thoracic spine  -Follow-up MRI revealed  lesion within the right anterior paraspinous soft tissues at the T5 level appears to be cystic. It is nonspecific. Given prominent adjacent osteophytic spurring it is conceivable it could be  degenerative in etiology.  They also see a small disc bulges or protrusions at T10-11 and T11-12 do not significantly narrow the spinal canal.    -She has completed physical therapy without much effect  -I am referring her to the pain clinic for further assessment and treatment

## 2025-01-03 ENCOUNTER — HOSPITAL ENCOUNTER (EMERGENCY)
Facility: HOSPITAL | Age: 50
Discharge: HOME | End: 2025-01-03
Attending: EMERGENCY MEDICINE
Payer: COMMERCIAL

## 2025-01-03 VITALS
OXYGEN SATURATION: 97 % | DIASTOLIC BLOOD PRESSURE: 67 MMHG | HEIGHT: 70 IN | HEART RATE: 83 BPM | BODY MASS INDEX: 41.95 KG/M2 | TEMPERATURE: 98.2 F | RESPIRATION RATE: 18 BRPM | WEIGHT: 293 LBS | SYSTOLIC BLOOD PRESSURE: 131 MMHG

## 2025-01-03 DIAGNOSIS — K08.89 PAIN, DENTAL: Primary | ICD-10-CM

## 2025-01-03 PROCEDURE — 99283 EMERGENCY DEPT VISIT LOW MDM: CPT | Performed by: EMERGENCY MEDICINE

## 2025-01-03 PROCEDURE — 2500000001 HC RX 250 WO HCPCS SELF ADMINISTERED DRUGS (ALT 637 FOR MEDICARE OP): Performed by: EMERGENCY MEDICINE

## 2025-01-03 RX ORDER — OXYCODONE AND ACETAMINOPHEN 5; 325 MG/1; MG/1
1 TABLET ORAL ONCE
Status: COMPLETED | OUTPATIENT
Start: 2025-01-03 | End: 2025-01-03

## 2025-01-03 RX ORDER — OXYCODONE AND ACETAMINOPHEN 5; 325 MG/1; MG/1
1 TABLET ORAL EVERY 8 HOURS PRN
Qty: 9 TABLET | Refills: 0 | Status: SHIPPED | OUTPATIENT
Start: 2025-01-03 | End: 2025-01-06

## 2025-01-03 RX ADMIN — OXYCODONE HYDROCHLORIDE AND ACETAMINOPHEN 1 TABLET: 5; 325 TABLET ORAL at 18:21

## 2025-01-03 ASSESSMENT — PAIN SCALES - GENERAL
PAINLEVEL_OUTOF10: 9
PAINLEVEL_OUTOF10: 8

## 2025-01-03 ASSESSMENT — PAIN DESCRIPTION - LOCATION: LOCATION: OTHER (COMMENT)

## 2025-01-03 ASSESSMENT — COLUMBIA-SUICIDE SEVERITY RATING SCALE - C-SSRS
6. HAVE YOU EVER DONE ANYTHING, STARTED TO DO ANYTHING, OR PREPARED TO DO ANYTHING TO END YOUR LIFE?: NO
1. IN THE PAST MONTH, HAVE YOU WISHED YOU WERE DEAD OR WISHED YOU COULD GO TO SLEEP AND NOT WAKE UP?: NO
2. HAVE YOU ACTUALLY HAD ANY THOUGHTS OF KILLING YOURSELF?: NO

## 2025-01-03 ASSESSMENT — PAIN DESCRIPTION - ORIENTATION: ORIENTATION: RIGHT;LOWER

## 2025-01-03 ASSESSMENT — PAIN DESCRIPTION - PAIN TYPE: TYPE: ACUTE PAIN

## 2025-01-03 ASSESSMENT — PAIN - FUNCTIONAL ASSESSMENT: PAIN_FUNCTIONAL_ASSESSMENT: 0-10

## 2025-01-03 NOTE — ED PROVIDER NOTES
HPI   Chief Complaint   Patient presents with    Dental Pain     Had tooth removed and is having worsening pain now. Went to [facility] last night and was ok until later last night. Pt then followed up with dentist who gave her atb and toradol but she repots the toradol isn't working.        Limitations to History: None    HPI: 49-year-old female presents with concern for right-sided upper dental and facial pain.  Patient had wisdom tooth removed 4 days ago.  Patient states that her pain was increasing and she was seen yesterday at a separate ER and placed on Toradol as well as antibiotic therapy.  Was seen by her dentist this morning who states that everything looks well and advised to continue treatment with the antibiotics as well as Toradol.  States the pain is progressed.  Using ice as well as this medication therapy without relief.  Denies any fever, chills, nausea, vomiting, vision change, neck pain.  Concern for the increased swelling.    ------------------------------------------------------------------------------------------------------------------------------------------  Physical Exam:    VS: As documented in the triage note and EMR flowsheet from this visit were reviewed.    Appearance: Alert. cooperative,  in no acute distress.   Skin: Intact,  dry skin, no lesions, rash, petechiae or purpura.   Eyes: PERRLA, EOMs intact,  Conjunctiva pink with no redness or exudates.   HENT: Normocephalic, atraumatic. Nares patent. No intraoral lesions.  Mild edema to the right lateral maxillary region into the portal region.  No evidence of periapical abscess.  Neck: Supple, without meningismus. Trachea at midline. No lymphadenopathy.  Psychiatric: Appropriate mood and affect.                Patient History   Past Medical History:   Diagnosis Date    Heel spur 03/03/2023    History of Chiari malformation     Hyperlipidemia     Memory loss     Pituitary abnormality (Multi)      History reviewed. No pertinent  surgical history.  Family History   Problem Relation Name Age of Onset    Hypertension Mother      Thyroid disease Mother      Asthma Mother      Osteoporosis Mother      Hypertension Father      Thyroid disease Father      Diabetes Father      Skin cancer Father      Heart attack Father      Breast cancer Other aunt      Social History     Tobacco Use    Smoking status: Never    Smokeless tobacco: Never   Vaping Use    Vaping status: Never Used   Substance Use Topics    Alcohol use: Never    Drug use: Never       Physical Exam   ED Triage Vitals [01/03/25 1810]   Temperature Heart Rate Respirations BP   36.8 °C (98.2 °F) 83 18 131/67      Pulse Ox Temp src Heart Rate Source Patient Position   97 % -- -- --      BP Location FiO2 (%)     -- --       Physical Exam      ED Course & MDM   Diagnoses as of 01/03/25 1853   Pain, dental                 No data recorded     [place] Coma Scale Score: 15 (01/03/25 1812 : Josh Rivera RN)                           Medical Decision Making  Medical Decision Making:    Patient appears well nontoxic.  Patient does have some swelling to the right upper face.  Afebrile.  Patient on antibiotics.  Treated with Percocet in the emergency department.  Will be treated with oral Percocet at home.  Follow-up with primary care and dentist.  Stable at time of discharge.    Escalation of Care:  Appropriate for discharge and follow-up with dentist and primary care.    Prescription Drug Consideration: Oral Percocet.          Procedure  Procedures     Rupesh Bailey DO  01/03/25 1854

## 2025-01-22 ENCOUNTER — OFFICE VISIT (OUTPATIENT)
Dept: PAIN MEDICINE | Facility: CLINIC | Age: 50
End: 2025-01-22
Payer: COMMERCIAL

## 2025-01-22 VITALS
HEART RATE: 90 BPM | SYSTOLIC BLOOD PRESSURE: 126 MMHG | RESPIRATION RATE: 18 BRPM | BODY MASS INDEX: 51.65 KG/M2 | WEIGHT: 293 LBS | DIASTOLIC BLOOD PRESSURE: 81 MMHG

## 2025-01-22 DIAGNOSIS — M25.562 CHRONIC PAIN OF BOTH KNEES: ICD-10-CM

## 2025-01-22 DIAGNOSIS — M25.561 CHRONIC PAIN OF BOTH KNEES: ICD-10-CM

## 2025-01-22 DIAGNOSIS — G89.29 CHRONIC PAIN OF BOTH KNEES: ICD-10-CM

## 2025-01-22 DIAGNOSIS — M54.16 LUMBAR RADICULOPATHY: Primary | ICD-10-CM

## 2025-01-22 DIAGNOSIS — M79.18 DIFFUSE MYOFASCIAL PAIN SYNDROME: ICD-10-CM

## 2025-01-22 DIAGNOSIS — M54.6 ACUTE MIDLINE THORACIC BACK PAIN: ICD-10-CM

## 2025-01-22 PROCEDURE — 99213 OFFICE O/P EST LOW 20 MIN: CPT | Performed by: STUDENT IN AN ORGANIZED HEALTH CARE EDUCATION/TRAINING PROGRAM

## 2025-01-22 RX ORDER — IBUPROFEN 200 MG
400 TABLET ORAL AS NEEDED
COMMUNITY

## 2025-01-22 RX ORDER — DULOXETIN HYDROCHLORIDE 30 MG/1
60 CAPSULE, DELAYED RELEASE ORAL DAILY
Qty: 60 CAPSULE | Refills: 11 | Status: SHIPPED | OUTPATIENT
Start: 2025-01-22 | End: 2026-01-22

## 2025-01-22 ASSESSMENT — PATIENT HEALTH QUESTIONNAIRE - PHQ9
SUM OF ALL RESPONSES TO PHQ9 QUESTIONS 1 AND 2: 0
2. FEELING DOWN, DEPRESSED OR HOPELESS: NOT AT ALL
1. LITTLE INTEREST OR PLEASURE IN DOING THINGS: NOT AT ALL

## 2025-01-22 NOTE — PROGRESS NOTES
Subjective   Patient ID: Rossi Genao is a 50 y.o. female who presents for Back Pain (NPV here for evaluation of Rt middle back will radiates to lt side and rt leg around her rt knee as pain increases it goes up her leg to her rt gluteal and down rt foot, rates pain 2/10 now and 9/10 at worst, describes as throbbing.  She denies any injury, pain just started a year ago. She is taking Ibuprofen, muscle relaxer help for mild pain, and PT  did not help she does HEP and stretching at home, when her pain is really bad nothing helps. She completed imaging xray, CT and MRI.   ) LUCA score  24%, SOAPP 4, screenings: depression and smoking negative, falls n/a for age.       HPI  Patient is presenting with a 7-month history of low back pain and right knee pain.  She has had this issue for greater than 1 year however over the past 7 months it has been acutely exacerbated or any weightbearing causes pain in her right knee predominantly on the medial joint line as well as symptoms radiating down from her back all the way to the level of her foot that are a different nature and character and is describes a sharp/stabbing sensation.  Her knee can reach up pain level of a 9/10 at night and states that it can throb.  When she is standing and walking she can have 7/10 pain radiating all the way down to her foot in an L4 dermatomal distribution.  She has done a significant amount of conservative treatments for these issues so far and within the past 6 months she has completed a full course of formal physical therapy to target her low back and knees per her report, she has tried acetaminophen without lasting relief, she has tried NSAIDs without lasting relief, muscle relaxants provided minimal benefit for her.  She would like to address her lumbar radicular component as well as her right knee.  Review of Systems   All other systems reviewed and are negative.      Objective   Physical Exam  Constitutional: No acute distress, well  appearing and well nourished. Patient appears stated age.  Eyes: Conjunctiva non-icteric and eye lids are without obvious rash or drooping. Pupils are symmetric.  Ears, Nose, Mouth, and Throat: External ears and nose appear to be without deformity or rash. No lesions or masses noted.  Hearing is grossly intact.  Neck: No JVD noted, tracheal position is midline.  Head and Face: Examination of the head and face revealed no abnormalities.  Respiratory: No gasping or shortness of breath noted, no use of accessory muscles noted.  Cardiovascular: Examination for edema is normal.   GI: Abdomen nontender to palpation.  Skin: No rashes or open lesions/ulcers identified on examined areas.    MSK:   No asymmetry or masses noted of the musculature.   Examination of the muscles/joints/bones show grossly normal range of motion unless noted below.    Neurologic:  Motor strength:   5/5 muscle strength of the upper extremities bilateral and equal.  5/5 muscle strength of the lower extremities bilaterally and equal.     Sensation:   Sensation intact to light touch in the bilateral upper and lower extremities.    Provocative tests: Negative Ta sign bilaterally, seated straight leg raise test did reproduce radicular symptoms on the right only.  Tenderness palpation the medial joint lines of the left and right knee.    Psychiatric: Mood and affect are normal.    Assessment/Plan   Diagnoses and all orders for this visit:  Lumbar radiculopathy  -     MR lumbar spine wo IV contrast; Future  -     DULoxetine (Cymbalta) 30 mg DR capsule; Take 2 capsules (60 mg) by mouth once daily. Take 1 tab in am for 1 week then take 2 tabs in morning with food ongoing.  Acute midline thoracic back pain  -     Referral to Pain Medicine  Diffuse myofascial pain syndrome  -     DULoxetine (Cymbalta) 30 mg DR capsule; Take 2 capsules (60 mg) by mouth once daily. Take 1 tab in am for 1 week then take 2 tabs in morning with food ongoing.  Chronic pain of  both knees  -     DULoxetine (Cymbalta) 30 mg DR capsule; Take 2 capsules (60 mg) by mouth once daily. Take 1 tab in am for 1 week then take 2 tabs in morning with food ongoing.  The patient´s history, physical exam and personal review of imaging indicate diagnoses of the above items.    Plan description:  -We will start her on duloxetine for neuropathic as well as joint pain  -Will obtain a lumbar MRI to better delineate what is going on her low back as she has failed a significant number of conservative therapies so far inclusive of physical therapy, ibuprofen, acetaminophen, muscle relaxants.  -We will schedule her for right knee intra-articular corticosteroid injection in the office.      Counseling:  The patient was counseled regarding diagnostic results, instructions for management, risk factor reductions, prognosis, patient and family education, impressions, risk and benefits of treatment options, as well as adherence to current treatment regimen. The importance of physical therapy/core strengthening was discussed in regard to an appropriate level for the patient to participate in currently, as well as progress as able. Nicotine and alcohol use were reviewed and discussed as appropriate in relation to cessation and abstinence as indiciated, time spent for smoking cessation counseling when appropriate is 3-10 minutes. Appropriate use of opioid medications if prescribed as well as adherance and compliance to routine screening and avoidance of any medication that causes side effects in combination such as benzodiazepines. OARRS reviewed when indicated and naloxone offered if opioid medication prescribed.    All questions and concerns were addressed during clinical visit. Patient verbalized understanding and agreement with the current plan and counselling. The patient was invited to contact us back anytime with any questions or concerns and follow-up with us in the future.           Anna Marie Brady RN 01/22/25  10:45 AM

## 2025-01-23 ENCOUNTER — TELEPHONE (OUTPATIENT)
Dept: PAIN MEDICINE | Facility: CLINIC | Age: 50
End: 2025-01-23
Payer: COMMERCIAL

## 2025-01-24 ENCOUNTER — HOSPITAL ENCOUNTER (OUTPATIENT)
Dept: RADIOLOGY | Facility: HOSPITAL | Age: 50
Discharge: HOME | End: 2025-01-24
Payer: COMMERCIAL

## 2025-01-24 DIAGNOSIS — M54.16 LUMBAR RADICULOPATHY: ICD-10-CM

## 2025-01-24 PROCEDURE — 72114 X-RAY EXAM L-S SPINE BENDING: CPT

## 2025-02-08 ENCOUNTER — HOSPITAL ENCOUNTER (EMERGENCY)
Facility: HOSPITAL | Age: 50
Discharge: HOME | End: 2025-02-08
Payer: COMMERCIAL

## 2025-02-08 VITALS
OXYGEN SATURATION: 98 % | SYSTOLIC BLOOD PRESSURE: 119 MMHG | DIASTOLIC BLOOD PRESSURE: 74 MMHG | WEIGHT: 293 LBS | HEART RATE: 79 BPM | RESPIRATION RATE: 16 BRPM | TEMPERATURE: 97.3 F | BODY MASS INDEX: 41.02 KG/M2 | HEIGHT: 71 IN

## 2025-02-08 DIAGNOSIS — M79.89 PAIN AND SWELLING OF RIGHT LOWER LEG: Primary | ICD-10-CM

## 2025-02-08 DIAGNOSIS — M79.661 PAIN AND SWELLING OF RIGHT LOWER LEG: Primary | ICD-10-CM

## 2025-02-08 PROCEDURE — 99281 EMR DPT VST MAYX REQ PHY/QHP: CPT

## 2025-02-08 PROCEDURE — 99282 EMERGENCY DEPT VISIT SF MDM: CPT | Performed by: NURSE PRACTITIONER

## 2025-02-08 ASSESSMENT — COLUMBIA-SUICIDE SEVERITY RATING SCALE - C-SSRS
1. IN THE PAST MONTH, HAVE YOU WISHED YOU WERE DEAD OR WISHED YOU COULD GO TO SLEEP AND NOT WAKE UP?: NO
2. HAVE YOU ACTUALLY HAD ANY THOUGHTS OF KILLING YOURSELF?: NO
6. HAVE YOU EVER DONE ANYTHING, STARTED TO DO ANYTHING, OR PREPARED TO DO ANYTHING TO END YOUR LIFE?: NO

## 2025-02-08 NOTE — ED PROVIDER NOTES
Chief Complaint   Patient presents with    Leg Swelling     Pt sent over from urgent care for right upper thigh pain, redness and swelling that started today. Concerned for a DVT.        Patient History    Past Medical History:   Diagnosis Date    Heel spur 03/03/2023    History of Chiari malformation     Hyperlipidemia     Memory loss     Pituitary abnormality (Multi)       Past Surgical History:   Procedure Laterality Date    OTHER SURGICAL HISTORY      cervical tumor removed    OTHER SURGICAL HISTORY Right     Rt leg veign stripped      Family History   Problem Relation Name Age of Onset    Hypertension Mother      Thyroid disease Mother      Asthma Mother      Osteoporosis Mother      Hypertension Father      Thyroid disease Father      Diabetes Father      Skin cancer Father      Heart attack Father      Breast cancer Other aunt       Social History     Social History Narrative    Not on file      No Known Allergies     PMH: Reviewed  PSH: Reviewed  Social History: Reviewed.   Allergies reviewed.     HPI: Rossi Genao is a 50 y.o. female who presents to the ED today unaccompanied with complaints of redness of the right upper thigh.  Noticed it starting this morning.  States is getting progressively larger and more painful.  Has history of superficial thrombus of the right lower extremity.  Was told previously to take Motrin for the thrombus.  Denies chest pain or shortness of breath.  Denies injury or trauma.    PHYSICAL EXAM:    GENERAL: Vitals noted, no distress. Alert and oriented x 3. Non-toxic.       HEAD: Normocephalic, atraumatic.     NECK: Supple. No midline or paraspinal tenderness through full range of motion.      CARDIAC: Regular rate, rhythm. No murmurs or rubs.    RESPIRATORY: Lungs clear and equal bilaterally. No respiratory distress.     MUSCULOSKELETAL & SKIN:  Warm, dry, and intact. No rash/lesions. No peripheral edema. Multiple inflamed varicose veins on the right upper thigh with  surrounding warmth and erythema. No calf pain.     NEURO: No focal neurologic deficits, acting appropriately.     Labs Reviewed - No data to display     No orders to display        Medical Decision Making         ED COURSE: This patient was seen and examined by myself independently. Likely superficial thrombus of the right upper thigh.  She has had this before.  Advised to use full-strength aspirin today and tomorrow and come in for ultrasound on Monday morning.  She verbalized understanding.  If no DVT, can use ibuprofen or Naprosyn and if she does have a DVT she will need to be started on blood thinners.  Follow-up with PCP next week.  Discharged home in stable condition with computer instructions given.      DIAGNOSTIC IMPRESSION: #1 right leg pain and swelling     Carmen Velasquez, LIA-CNP  02/08/25 2807

## 2025-02-08 NOTE — DISCHARGE INSTRUCTIONS
Take a full-strength aspirin today and tomorrow  Return Monday morning for outpatient ultrasound of your right leg  If no DVT found on ultrasound Monday, use over-the-counter ibuprofen or Naprosyn

## 2025-02-10 ENCOUNTER — HOSPITAL ENCOUNTER (OUTPATIENT)
Dept: VASCULAR MEDICINE | Facility: HOSPITAL | Age: 50
Discharge: HOME | End: 2025-02-10
Payer: COMMERCIAL

## 2025-02-10 DIAGNOSIS — M79.604 RIGHT LEG PAIN: ICD-10-CM

## 2025-02-10 DIAGNOSIS — M79.89 RIGHT LEG SWELLING: ICD-10-CM

## 2025-02-10 DIAGNOSIS — M79.651 PAIN IN RIGHT THIGH: ICD-10-CM

## 2025-02-10 PROCEDURE — 93971 EXTREMITY STUDY: CPT

## 2025-02-10 PROCEDURE — 93971 EXTREMITY STUDY: CPT | Performed by: SURGERY

## 2025-02-12 ENCOUNTER — OFFICE VISIT (OUTPATIENT)
Dept: PAIN MEDICINE | Facility: CLINIC | Age: 50
End: 2025-02-12
Payer: COMMERCIAL

## 2025-02-12 ENCOUNTER — TELEPHONE (OUTPATIENT)
Age: 50
End: 2025-02-12

## 2025-02-12 VITALS
DIASTOLIC BLOOD PRESSURE: 79 MMHG | SYSTOLIC BLOOD PRESSURE: 115 MMHG | WEIGHT: 293 LBS | BODY MASS INDEX: 49.23 KG/M2 | HEART RATE: 83 BPM | RESPIRATION RATE: 16 BRPM

## 2025-02-12 DIAGNOSIS — M25.562 CHRONIC PAIN OF BOTH KNEES: ICD-10-CM

## 2025-02-12 DIAGNOSIS — M79.18 DIFFUSE MYOFASCIAL PAIN SYNDROME: Primary | ICD-10-CM

## 2025-02-12 DIAGNOSIS — M47.816 LUMBAR SPONDYLOSIS: ICD-10-CM

## 2025-02-12 DIAGNOSIS — G89.29 CHRONIC PAIN OF BOTH KNEES: ICD-10-CM

## 2025-02-12 DIAGNOSIS — M25.561 CHRONIC PAIN OF BOTH KNEES: ICD-10-CM

## 2025-02-12 PROCEDURE — 99213 OFFICE O/P EST LOW 20 MIN: CPT | Performed by: STUDENT IN AN ORGANIZED HEALTH CARE EDUCATION/TRAINING PROGRAM

## 2025-02-12 ASSESSMENT — PATIENT HEALTH QUESTIONNAIRE - PHQ9
1. LITTLE INTEREST OR PLEASURE IN DOING THINGS: NOT AT ALL
SUM OF ALL RESPONSES TO PHQ9 QUESTIONS 1 AND 2: 0
2. FEELING DOWN, DEPRESSED OR HOPELESS: NOT AT ALL

## 2025-02-12 NOTE — TELEPHONE ENCOUNTER
Patient called stating she had an ultrasound done on Monday and is asking for results.  Looks like ER ordered.  Can you review and contact patient with those results?

## 2025-02-12 NOTE — PROGRESS NOTES
Subjective   Patient ID: Rossi Genao is a 50 y.o. female who presents for Follow-up (FOLLOW UP LUMBAR X-RAY, POSSIBLE CORTISONE INJECTION, SHE STATES SHE HAS NO PAIN TO THE RIGHT KNEE SINCE SHE STARTED THE CYMBALTA WITH IBUPROFEN, SHE CONTINUES TO TAKE FLEXERIL PRN, BACK PAIN IS JUST BEEN AN ACHE). HEAT PRN, SHE CONTINUES TO DO HOME EXERCISE FOR HER BACK FROM PHYSICAL THERAPY, PAIN SCORE FOR BACK 1/10, PAIN SCORE KNEE 0/10, ETOH NO, LUCA=12%  HPI  Patient is presenting with predominantly back and right knee pain at this time.  She states significant improvement with Cymbalta and ibuprofen.  She does take Flexeril on occasion but does not take it on a regular basis.  Overall she feels significantly better and states that her daytime pain is almost completely gone.  She only has occasional 2/10 pain at worst in the evening time in her back and right knee.  This is not a radicular component these appears to be isolated to her low back axially as well as in her right knee.  We did review her lumbar x-ray and discussed that she has some degenerative changes indicating most likely facet mediated pain.  Overall she feels no additional interventions are warranted though she feels pretty good with medication changes.  Review of Systems   All other systems reviewed and are negative.      Objective   Physical Exam  Constitutional: No acute distress, well appearing and well nourished. Patient appears stated age.  Eyes: Conjunctiva non-icteric and eye lids are without obvious rash or drooping. Pupils are symmetric.  Ears, Nose, Mouth, and Throat: External ears and nose appear to be without deformity or rash. No lesions or masses noted.  Hearing is grossly intact.  Neck: No JVD noted, tracheal position is midline.  Head and Face: Examination of the head and face revealed no abnormalities.  Respiratory: No gasping or shortness of breath noted, no use of accessory muscles noted.  Cardiovascular: Examination for edema is normal.    GI: Abdomen nontender to palpation.  Skin: No rashes or open lesions/ulcers identified on examined areas.    MSK:   No asymmetry or masses noted of the musculature.   Examination of the muscles/joints/bones show grossly normal range of motion unless noted below.    Neurologic:  Motor strength:   5/5 muscle strength of the upper extremities bilateral and equal.  5/5 muscle strength of the lower extremities bilaterally and equal.     Sensation:   Sensation intact to light touch in the bilateral upper and lower extremities.    Provocative tests: Negative Ta sign bilaterally, mild tenderness palpation hypertonicity in lumbar paraspinal musculature.  Mild tenderness palpation in the medial joint line of the right knee.    Psychiatric: Mood and affect are normal.    Assessment/Plan   Diagnoses and all orders for this visit:  Diffuse myofascial pain syndrome  Chronic pain of both knees  Lumbar spondylosis  The patient´s history, physical exam and personal review of imaging indicate diagnoses of the above items.    Plan description:  -Continue duloxetine 60 mg daily  -Continue ibuprofen as needed  -Take Flexeril 10 mg as needed, we can provide a refill if required  -Follow-up in 6 months or sooner if any issues arise      Counseling:  The patient was counseled regarding diagnostic results, instructions for management, risk factor reductions, prognosis, patient and family education, impressions, risk and benefits of treatment options, as well as adherence to current treatment regimen. The importance of physical therapy/core strengthening was discussed in regard to an appropriate level for the patient to participate in currently, as well as progress as able. Nicotine and alcohol use were reviewed and discussed as appropriate in relation to cessation and abstinence as indiciated, time spent for smoking cessation counseling when appropriate is 3-10 minutes. Appropriate use of opioid medications if prescribed as well as  adherance and compliance to routine screening and avoidance of any medication that causes side effects in combination such as benzodiazepines. OARRS reviewed when indicated and naloxone offered if opioid medication prescribed.    All questions and concerns were addressed during clinical visit. Patient verbalized understanding and agreement with the current plan and counselling. The patient was invited to contact us back anytime with any questions or concerns and follow-up with us in the future.           Delia Bush CMA 02/12/25 8:48 AM

## 2025-02-13 ENCOUNTER — TELEPHONE (OUTPATIENT)
Age: 50
End: 2025-02-13
Payer: COMMERCIAL

## 2025-02-13 NOTE — TELEPHONE ENCOUNTER
This is just an FYI.  I have called twice trying to reach her and I simply left a message that she could page me this evening so we can talk about her condition and I can answer questions.

## 2025-02-19 ENCOUNTER — TELEPHONE (OUTPATIENT)
Age: 50
End: 2025-02-19
Payer: COMMERCIAL

## 2025-02-19 DIAGNOSIS — I82.491 ACUTE DEEP VEIN THROMBOSIS (DVT) OF OTHER SPECIFIED VEIN OF RIGHT LOWER EXTREMITY: Primary | ICD-10-CM

## 2025-02-19 NOTE — TELEPHONE ENCOUNTER
I called and spoke to her today.  It is possible that she may be having clot propagation of her original clot.  I am going to place her on Eliquis twice a day for 7 days just in case and I would like for her to have another venous Doppler of her right lower extremity.  I will see her in follow-up shortly thereafter

## 2025-02-19 NOTE — TELEPHONE ENCOUNTER
PT CALLED IN STATING THAT SHE NOW HAS THREE MORE SUPERFICIAL BLOOD CLOTS ON THE SIDE OF HER LEG, WAS ALREADY SEEN AND HAD TESTING DONE, PLEASE ADVISE

## 2025-02-21 ENCOUNTER — TELEPHONE (OUTPATIENT)
Age: 50
End: 2025-02-21

## 2025-02-21 ENCOUNTER — HOSPITAL ENCOUNTER (OUTPATIENT)
Dept: VASCULAR MEDICINE | Facility: HOSPITAL | Age: 50
Discharge: HOME | End: 2025-02-21
Payer: COMMERCIAL

## 2025-02-21 DIAGNOSIS — M79.661 PAIN IN RIGHT LOWER LEG: ICD-10-CM

## 2025-02-21 DIAGNOSIS — I82.491 ACUTE DEEP VEIN THROMBOSIS (DVT) OF OTHER SPECIFIED VEIN OF RIGHT LOWER EXTREMITY: ICD-10-CM

## 2025-02-21 PROCEDURE — 93971 EXTREMITY STUDY: CPT | Performed by: STUDENT IN AN ORGANIZED HEALTH CARE EDUCATION/TRAINING PROGRAM

## 2025-02-21 PROCEDURE — 93971 EXTREMITY STUDY: CPT

## 2025-02-24 NOTE — TELEPHONE ENCOUNTER
I called her at 5:30 PM to discuss her condition and she states that since starting the blood thinner the swelling has gone down significantly her leg is no longer red.  She has 2 days left on the blood thinner.  I told her that I am checking with vascular to see if we need to continue a blood thinner longer and I will get back with her tomorrow.  She expressed understanding I will be awaiting my call

## 2025-02-25 DIAGNOSIS — I80.9 THROMBOPHLEBITIS: Primary | ICD-10-CM

## 2025-02-26 ENCOUNTER — TELEPHONE (OUTPATIENT)
Age: 50
End: 2025-02-26
Payer: COMMERCIAL

## 2025-02-26 DIAGNOSIS — I82.491 ACUTE DEEP VEIN THROMBOSIS (DVT) OF OTHER SPECIFIED VEIN OF RIGHT LOWER EXTREMITY: ICD-10-CM

## 2025-02-27 ENCOUNTER — TELEPHONE (OUTPATIENT)
Age: 50
End: 2025-02-27
Payer: COMMERCIAL

## 2025-02-27 DIAGNOSIS — I82.491 ACUTE DEEP VEIN THROMBOSIS (DVT) OF OTHER SPECIFIED VEIN OF RIGHT LOWER EXTREMITY: ICD-10-CM

## 2025-03-05 ENCOUNTER — APPOINTMENT (OUTPATIENT)
Facility: CLINIC | Age: 50
End: 2025-03-05
Payer: COMMERCIAL

## 2025-03-05 VITALS
WEIGHT: 293 LBS | SYSTOLIC BLOOD PRESSURE: 114 MMHG | HEART RATE: 92 BPM | DIASTOLIC BLOOD PRESSURE: 70 MMHG | HEIGHT: 71 IN | BODY MASS INDEX: 41.02 KG/M2

## 2025-03-05 DIAGNOSIS — E66.813 CLASS 3 SEVERE OBESITY DUE TO EXCESS CALORIES WITH SERIOUS COMORBIDITY AND BODY MASS INDEX (BMI) OF 50.0 TO 59.9 IN ADULT: Primary | ICD-10-CM

## 2025-03-05 DIAGNOSIS — E66.01 CLASS 3 SEVERE OBESITY DUE TO EXCESS CALORIES WITH SERIOUS COMORBIDITY AND BODY MASS INDEX (BMI) OF 50.0 TO 59.9 IN ADULT: Primary | ICD-10-CM

## 2025-03-05 DIAGNOSIS — I87.321 CHRONIC VENOUS HYPERTENSION WITH INFLAMMATION INVOLVING RIGHT SIDE: ICD-10-CM

## 2025-03-05 DIAGNOSIS — I80.9 THROMBOPHLEBITIS: ICD-10-CM

## 2025-03-05 PROCEDURE — 99204 OFFICE O/P NEW MOD 45 MIN: CPT | Performed by: SURGERY

## 2025-03-05 PROCEDURE — 3008F BODY MASS INDEX DOCD: CPT | Performed by: SURGERY

## 2025-03-05 NOTE — PROGRESS NOTES
NPV REASON: right leg phlebitis   Referring Provider: Dr. Cross    CURRENT ENCOUNTER:  Rossi Genao is 50 y.o. female here for NPV for right leg phlebitis .  Went to the ER 2/8th for leg swelling, right leg - dx with phlebitis    First time ever she had phlebitis - started 2/10 locally in the thigh then the 19th it involved the entire right leg  Had vv for years - typically would cause swelling and pain  Had leg edema and towards ankle  Was put on eliquis and started on benadryl - rash in face due to eliquis  Somewhat better but still has some skin changes  Some itching with it  Works on her feet at a [employer] all day    Using medical grade compression stockings: has tried compression but they roll down;   Previous vein procedures: left leg vein stripping - >20 yrs ago; indication was for swelling   Previous phlebitis/DVT/PE: this event only  Previous venous ulcers: no  Family hx of varicose veins: grandmother with VV;       RIGHT LEG C3 Edema and C4c [place] Phlebectatica  RIGHT LEG PAIN 1, VARICOSE VEINS 3, and VENOUS EDEMA 2  RIGHT LEG CEAP: C4c  RIGHT LEG VCSS SCORE: 6 + extensive phlebitis    LEFT LEG C3 Edema  LEFT LEG PAIN 1, VARICOSE VEINS 2, and VENOUS EDEMA 1  LEFT LEG CEAP: C3  LEFT LEG VCSS SCORE: 4    PastMedHX:  Past Medical History:   Diagnosis Date    Heel spur 03/03/2023    History of Chiari malformation     Hyperlipidemia     Memory loss     Pituitary abnormality (Multi)      Meds:   Current Outpatient Medications:     apixaban (Eliquis) 5 mg tablet, Take 1 tablet (5 mg) by mouth 2 times a day., Disp: 60 tablet, Rfl: 0    aspirin 81 mg chewable tablet, Chew 1 tablet (81 mg) once daily., Disp: , Rfl:     atorvastatin (Lipitor) 20 mg tablet, Take 1 tablet (20 mg) by mouth once daily at bedtime., Disp: 100 tablet, Rfl: 1    cyclobenzaprine (Flexeril) 10 mg tablet, Take 1 tablet (10 mg) by mouth 3 times a day as needed for muscle spasms for up to 7 days., Disp: 21 tablet, Rfl: 0    DULoxetine  (Cymbalta) 30 mg DR capsule, Take 2 capsules (60 mg) by mouth once daily. Take 1 tab in am for 1 week then take 2 tabs in morning with food ongoing., Disp: 60 capsule, Rfl: 11    furosemide (Lasix) 40 mg tablet, Take 1.5 tablets (60 mg) by mouth once daily., Disp: 135 tablet, Rfl: 1    ibuprofen 200 mg tablet, Take 2 tablets (400 mg) by mouth if needed for mild pain (1 - 3) or moderate pain (4 - 6)., Disp: , Rfl:     Allergies:   No Known Allergies    ROS:  Review of Systems     Objective:  Vitals:  Vitals:    03/05/25 1523   BP: 114/70   Pulse: 92        Exam:    No distress  Breathing comfortably   Not tachycardic  Palpable bilateral radial pulses  Abd soft, nt, nd, obese  Bilateral legs with intact pulses   Bilateral perfused feet  bilateral leg edema  Resolving phlebitis in right rita-medial thigh down to calf; open varices extending rita-lateral on the right thigh  Soft varices left thigh                      Labs:  Lab Results   Component Value Date    WBC 10.1 12/20/2024    WBC 9.4 02/17/2024    WBC 10.2 02/24/2023    HGB 13.6 12/20/2024    HGB 12.6 02/17/2024    HGB 11.9 (L) 02/24/2023    HCT 43.7 12/20/2024    HCT 40.5 02/17/2024    HCT 40.0 02/24/2023    MCV 90 12/20/2024    MCV 88 02/17/2024    MCV 84 02/24/2023     12/20/2024     Lab Results   Component Value Date    CREATININE 0.81 12/20/2024    CREATININE 0.83 09/06/2024    CREATININE 0.85 03/15/2024    BUN 11 12/20/2024    BUN 12 09/06/2024    BUN 14 03/15/2024     12/20/2024     09/06/2024     03/15/2024    K 4.3 12/20/2024    K 4.2 09/06/2024    K 3.7 03/15/2024     12/20/2024     09/06/2024     03/15/2024    CO2 31 12/20/2024    CO2 28 09/06/2024    CO2 33 (H) 03/15/2024         Imaging:  none    Assessment & Plan:  Rossi Genao is 50 y.o. female here for NPV for right leg phlebitis .  Went to the ER 2/8th for leg swelling, right leg - dx with phlebitis  First time ever she had phlebitis - started  2/10 locally in the thigh then the 19th it involved the entire right leg  Was put on eliquis and started on benadryl - rash in face due to eliquis  Using medical grade compression stockings: has tried compression but they roll down;   Previous vein procedures: left leg vein stripping - >20 yrs ago; indication was for swelling     RIGHT LEG C3 Edema and C4c [place] Phlebectatica  RIGHT LEG VCSS SCORE: 6 + extensive phlebitis  LEFT LEG C3 Edema  LEFT LEG VCSS SCORE: 4    Will switch over to xarelto today and treat for a total of 6 weeks.   Will need xarelto lorraine procedurally as well due to slight increased risk with h/o phlebitis.     1) please schedule your ultrasound of the right leg here at [facility] vascular lab  2) we can then also have a virtual video follow up to discuss the results and plan moving forward  3) I have also referred you to bariatrics/weight loss group  4) you will need a total of 6 weeks of being on blood thinners for this. Any extra xarelto you have, please save as we will use it around the time of your procedure.     Diana Carl MD, MHS, RPVI  , [employer]  Director, [facility], [facility]  Co-Director, Vascular Laboratories, [facility] [place]  Division of Vascular Surgery and Endovascular Therapy  [facility]

## 2025-03-05 NOTE — PATIENT INSTRUCTIONS
It was a pleasure taking care of you today and appreciate your seeing us at our [facility].     Today's plan is as follows:  1) please schedule your ultrasound of the right leg here at [facility] vascular lab  2) we can then also have a virtual video follow up to discuss the results and plan moving forward  3) I have also referred you to bariatrics/weight loss group  4) you will need a total of 6 weeks of being on blood thinners for this. Any extra xarelto you have, please save as we will use it around the time of your procedure.       Please call the office with any questions at [phone].   You can speak to our secretaries or our clinical nurses for specific questions.   For Vein Center specific questions, you can also call [phone] or email at [email or web address]  If you need coordinating your appointments and testing you can do these at the  or by calling my office shortly after your visit.

## 2025-03-07 ENCOUNTER — APPOINTMENT (OUTPATIENT)
Age: 50
End: 2025-03-07
Payer: COMMERCIAL

## 2025-03-21 ENCOUNTER — TELEPHONE (OUTPATIENT)
Dept: PAIN MEDICINE | Facility: CLINIC | Age: 50
End: 2025-03-21
Payer: COMMERCIAL

## 2025-03-21 ENCOUNTER — HOSPITAL ENCOUNTER (OUTPATIENT)
Dept: VASCULAR MEDICINE | Facility: HOSPITAL | Age: 50
Discharge: HOME | End: 2025-03-21
Payer: COMMERCIAL

## 2025-03-21 DIAGNOSIS — I82.811 EMBOLISM AND THROMBOSIS OF SUPERFICIAL VEINS OF RIGHT LOWER EXTREMITY: ICD-10-CM

## 2025-03-21 DIAGNOSIS — I80.9 THROMBOPHLEBITIS: ICD-10-CM

## 2025-03-21 DIAGNOSIS — I80.01 PHLEBITIS AND THROMBOPHLEBITIS OF SUPERFICIAL VESSELS OF RIGHT LOWER EXTREMITY: ICD-10-CM

## 2025-03-21 PROCEDURE — 93971 EXTREMITY STUDY: CPT

## 2025-03-21 PROCEDURE — 93971 EXTREMITY STUDY: CPT | Performed by: INTERNAL MEDICINE

## 2025-03-21 NOTE — TELEPHONE ENCOUNTER
----- Message from Gerald Saul sent at 3/21/2025  3:43 PM EDT -----  Regarding: RE: mri denied  PT order placed attached to last office visit  ----- Message -----  From: Delia Bush CMA  Sent: 3/20/2025   1:47 PM EDT  To: Gerald Saul DO  Subject: mri denied                                       Not medically needed. No documented notes of recent physical therapy/physician directed physical therapy/ home exercise program.

## 2025-03-26 ENCOUNTER — APPOINTMENT (OUTPATIENT)
Dept: CARDIOLOGY | Facility: CLINIC | Age: 50
End: 2025-03-26
Payer: COMMERCIAL

## 2025-03-28 DIAGNOSIS — I80.9 SUPERFICIAL THROMBOPHLEBITIS: Primary | ICD-10-CM

## 2025-04-02 ENCOUNTER — APPOINTMENT (OUTPATIENT)
Facility: CLINIC | Age: 50
End: 2025-04-02
Payer: COMMERCIAL

## 2025-04-02 VITALS — BODY MASS INDEX: 49.9 KG/M2 | HEIGHT: 71 IN

## 2025-04-02 DIAGNOSIS — I80.9 THROMBOPHLEBITIS: Primary | ICD-10-CM

## 2025-04-02 DIAGNOSIS — I87.321 CHRONIC VENOUS HYPERTENSION WITH INFLAMMATION INVOLVING RIGHT SIDE: ICD-10-CM

## 2025-04-02 PROCEDURE — 99213 OFFICE O/P EST LOW 20 MIN: CPT | Performed by: SURGERY

## 2025-04-02 PROCEDURE — 1036F TOBACCO NON-USER: CPT | Performed by: SURGERY

## 2025-04-02 NOTE — PATIENT INSTRUCTIONS
It was a pleasure taking care of you today and appreciate your seeing us at our [facility] [place] [facility]    Based on your evaluation, I am recommeding the followin) I would recommend right leg GSV RFA and varithena and this would be a single session procedure - we will plan to aim for  or   2) Please bring your compression socks to the day of the procedure.   3) We will proceed with insurance approval and call you once we can proceed with scheduling your procedures.    Please call the office with any questions at [phone]  For Vein Center specific questions, particularly insurance related questions of booking your [place] intervention, you can call [phone] or email at [email or web address]      Diana Carl MD, MHS, RPVI  , [employer] [employer]  Director, [facility], [facility] [place]  Co-Director, Vascular Laboratories, [facility] [place]  Division of Vascular Surgery and Endovascular Therapy  [facility]

## 2025-04-02 NOTE — PROGRESS NOTES
F/U REASON: CVI VI study and plan of care    CURRENT ENCOUNTER:  Rossi Genao is 50 y.o. female here for follow up of CVI VI study and plan of care.  Discussed VI scans after reviewing them  Patient in agreement with plan as discussed below  Currently completing her 6 wks of xarelto   Really can't afford to pay out of pocket for more    Meds:   Current Outpatient Medications:     aspirin 81 mg chewable tablet, Chew 1 tablet (81 mg) once daily., Disp: , Rfl:     atorvastatin (Lipitor) 20 mg tablet, Take 1 tablet (20 mg) by mouth once daily at bedtime., Disp: 100 tablet, Rfl: 1    cyclobenzaprine (Flexeril) 10 mg tablet, Take 1 tablet (10 mg) by mouth 3 times a day as needed for muscle spasms for up to 7 days., Disp: 21 tablet, Rfl: 0    DULoxetine (Cymbalta) 30 mg DR capsule, Take 2 capsules (60 mg) by mouth once daily. Take 1 tab in am for 1 week then take 2 tabs in morning with food ongoing., Disp: 60 capsule, Rfl: 11    furosemide (Lasix) 40 mg tablet, Take 1.5 tablets (60 mg) by mouth once daily., Disp: 135 tablet, Rfl: 1    ibuprofen 200 mg tablet, Take 2 tablets (400 mg) by mouth if needed for mild pain (1 - 3) or moderate pain (4 - 6)., Disp: , Rfl:     rivaroxaban (Xarelto) 20 mg tablet, Take 1 tablet (20 mg) by mouth once daily in the evening. Take with meals. Take with food., Disp: 30 tablet, Rfl: 11    Allergies:   No Known Allergies    ROS:  Review of Systems  otherwise unremarkable    Objective:  Vitals:  There were no vitals filed for this visit.     Exam:  Nvirtual video call  No distress  Breathing comfortably      Labs:  Lab Results   Component Value Date    WBC 10.1 12/20/2024    WBC 9.4 02/17/2024    WBC 10.2 02/24/2023    HGB 13.6 12/20/2024    HGB 12.6 02/17/2024    HGB 11.9 (L) 02/24/2023    HCT 43.7 12/20/2024    HCT 40.5 02/17/2024    HCT 40.0 02/24/2023    MCV 90 12/20/2024    MCV 88 02/17/2024    MCV 84 02/24/2023     12/20/2024     Lab Results   Component Value Date    CREATININE  0.81 12/20/2024    CREATININE 0.83 09/06/2024    CREATININE 0.85 03/15/2024    BUN 11 12/20/2024    BUN 12 09/06/2024    BUN 14 03/15/2024     12/20/2024     09/06/2024     03/15/2024    K 4.3 12/20/2024    K 4.2 09/06/2024    K 3.7 03/15/2024     12/20/2024     09/06/2024     03/15/2024    CO2 31 12/20/2024    CO2 28 09/06/2024    CO2 33 (H) 03/15/2024         Imaging:            Assessment & Plan:  Rossi Genao is 50 y.o. female here for NPV for right leg phlebitis   .  Went to the ER 2/8th for leg swelling, right leg - dx with phlebitis  First time ever she had phlebitis - started 2/10 locally in the thigh then the 19th it involved the entire right leg  Was put on eliquis and started on benadryl - rash in face due to eliquis  Using medical grade compression stockings: has tried compression but they roll down;   Previous vein procedures: left leg vein stripping - >20 yrs ago; indication was for swelling      RIGHT LEG C3 Edema and C4c [place] Phlebectatica  RIGHT LEG VCSS SCORE: 6 + extensive phlebitis  LEFT LEG C3 Edema  LEFT LEG VCSS SCORE: 4     Will switch over to xarelto today and treat for a total of 6 weeks.   Will need xarelto lorraine procedurally as well due to slight increased risk with h/o phlebitis.      1) I would recommend right leg GSV RFA and varithena and this would be a single session procedure - we will plan to aim for 4/18 or 5/7th    Diana Carl MD, MHS, RPVI  , [employer]  Director, [facility], [facility] [place]  Co-Director, Vascular Laboratories, [facility] [place]  Division of Vascular Surgery and Endovascular Therapy  [facility]

## 2025-04-08 DIAGNOSIS — R60.9 EDEMA, UNSPECIFIED TYPE: ICD-10-CM

## 2025-04-18 ENCOUNTER — PROCEDURE VISIT (OUTPATIENT)
Facility: HOSPITAL | Age: 50
End: 2025-04-18
Payer: COMMERCIAL

## 2025-04-18 VITALS
HEART RATE: 77 BPM | TEMPERATURE: 98.2 F | SYSTOLIC BLOOD PRESSURE: 124 MMHG | RESPIRATION RATE: 16 BRPM | DIASTOLIC BLOOD PRESSURE: 80 MMHG

## 2025-04-18 DIAGNOSIS — I83.811 VARICOSE VEINS OF RIGHT LOWER EXTREMITY WITH PAIN: ICD-10-CM

## 2025-04-18 DIAGNOSIS — I80.9 THROMBOPHLEBITIS: ICD-10-CM

## 2025-04-18 DIAGNOSIS — M47.816 LUMBAR SPONDYLOSIS: Primary | ICD-10-CM

## 2025-04-18 DIAGNOSIS — I87.321 CHRONIC VENOUS HYPERTENSION WITH INFLAMMATION INVOLVING RIGHT SIDE: ICD-10-CM

## 2025-04-18 PROCEDURE — 36465 NJX NONCMPND SCLRSNT 1 VEIN: CPT | Performed by: SURGERY

## 2025-04-18 ASSESSMENT — ENCOUNTER SYMPTOMS
OCCASIONAL FEELINGS OF UNSTEADINESS: 0
LOSS OF SENSATION IN FEET: 0
DEPRESSION: 0

## 2025-04-18 NOTE — PROGRESS NOTES
Patient was seen today for administration of VARITHENA to right leg at the level of lateral thigh.     The patient was given an explanation of the protocol for administering Varithena risks and benefits were explained to the patient. After consent was obtained, the patient was positioned on the exam table in reverse trendelenberg and the right leg was prepped and draped (after tributaries and perforators were identified and marked).     We initially started off with the plan to do saphenous vein radiofrequency ablation.  This was of the anterior greater saphenous vein.  On scanning the anterior saphenous vein, there was significant tortuosity with near 90 degree turns on 2 separate spots.  We attempted to cannulate the vein which we did so successfully however the wire would not take the turns and this was despite some support from the sheath which we did manage to place and this was the 7 [place] sheath.  At this point we had to abort the radiofrequency ablation.  This may need to have a smaller catheter available for reattempt at the next visit.  If that is not feasible then we would need to do laser with a microfiber tip as a third potential option if the #2 option does not work.  Therefore we will bring her back at the next session to reattempt with a smaller catheter the saphenous ablation.    The veins requiring treatment were identified and measured/marked. The vein was cannulated using US guidance and venous access was confirmed. Once access was obtained, the patient was placed in steep trendelenberg for 3 minutes to drain the veins with a foam wedge was placed at foot for elevation of leg.     Aseptic technique was used along with the 's recommendation for product handling, a total of 11 cc was administered over a 1 access point with 2 administrations allowing the varithena to flow antegrade and retrograde with digit compression guidance.  While the drug was administered, we compressed the  distal inflow vein and the perforators - the foam was observed as it travelled up the desired veins to be treated. During this time the ankle was also flexed to help compress the perforators.     ECHOSCLEROTHERAPY: YES  MAX VEIN SIZE TREATED: 5MM    Physical Exam  Musculoskeletal:        Legs:        After 2 minutes of compression, the ankle was then pumped for 30 pumps to help the foam travel cephalad. After treatment was completed, the access cannula/needle was removed and compression was held. We checked for any DVT or foam along the treated vein path and this was negative. Steri strip was applied to the access site(s) and then kerlix/ and compression pads/dressing was applied to the leg.     No reactions occurred during the administration nor while remaining in the clinic for about 10 minutes.   Wrap will stay on for 48 hours. patient will avoid heavy exercises for 7 days and wear compression stockings on the treated leg for 2 weeks, avoid inactivity and do daily walking.   Follow up will be in 7 days with a repeat duplex in our vascular lab.    STAFF: HARESH  START: 1037  STOP: 8621    Notable Findings: will need to come back for re-attempt at AGSV RFA given tortuosity      Diana Carl MD, MHS, RPVI  , [facility] [employer]  Director, Center [facility], [facility] [place]  Co-Director, Vascular Laboratories, [facility] [place]  Division of Vascular Surgery and Endovascular Therapy  [facility]

## 2025-04-24 DIAGNOSIS — R60.9 EDEMA, UNSPECIFIED TYPE: ICD-10-CM

## 2025-04-25 ENCOUNTER — HOSPITAL ENCOUNTER (OUTPATIENT)
Dept: VASCULAR MEDICINE | Facility: HOSPITAL | Age: 50
Discharge: HOME | End: 2025-04-25
Payer: COMMERCIAL

## 2025-04-25 DIAGNOSIS — I83.891 VARICOSE VEINS OF RIGHT LOWER EXTREMITY WITH OTHER COMPLICATIONS: ICD-10-CM

## 2025-04-25 DIAGNOSIS — R60.9 EDEMA, UNSPECIFIED TYPE: ICD-10-CM

## 2025-04-25 PROCEDURE — 93971 EXTREMITY STUDY: CPT

## 2025-05-06 ENCOUNTER — TELEPHONE (OUTPATIENT)
Facility: HOSPITAL | Age: 50
End: 2025-05-06

## 2025-05-07 ENCOUNTER — APPOINTMENT (OUTPATIENT)
Facility: CLINIC | Age: 50
End: 2025-05-07
Payer: COMMERCIAL

## 2025-05-07 ENCOUNTER — PROCEDURE VISIT (OUTPATIENT)
Facility: HOSPITAL | Age: 50
End: 2025-05-07
Payer: COMMERCIAL

## 2025-05-07 VITALS
RESPIRATION RATE: 20 BRPM | TEMPERATURE: 97.3 F | HEART RATE: 84 BPM | SYSTOLIC BLOOD PRESSURE: 125 MMHG | DIASTOLIC BLOOD PRESSURE: 65 MMHG

## 2025-05-07 DIAGNOSIS — I83.811 VARICOSE VEINS OF LEG WITH PAIN, RIGHT: Primary | ICD-10-CM

## 2025-05-07 PROCEDURE — 99212 OFFICE O/P EST SF 10 MIN: CPT | Performed by: SURGERY

## 2025-05-07 NOTE — PROGRESS NOTES
CURRENT ENCOUNTER:  Rossi Genao is 50 y.o. female here for planned anterior saphenous vein ablation.      In the interval she had notable thrombosis of the varicosities in the treated veins at the last visit with Lizbet.  Once we were prepped and draped and ready to proceed, ultrasound evaluation of her anterior saphenous was notable for a thrombosed and closed anterior saphenous all the way to the junction.  Therefore any further ablation interventions were aborted.  We did some small amount of micro thrombectomy for the drainable areas and she tolerated this well.    We will see her back in September to see how the leg is doing at that time.      Diana Carl MD, MHS, RPVI  , [facility] [employer]  Director, Center [facility] Care, [facility] [place]  Co-Director, Vascular Laboratories, [facility] [place]  Division of Vascular Surgery and Endovascular Therapy  [facility]

## 2025-05-14 ENCOUNTER — APPOINTMENT (OUTPATIENT)
Dept: VASCULAR MEDICINE | Facility: HOSPITAL | Age: 50
End: 2025-05-14
Payer: COMMERCIAL

## 2025-05-20 ENCOUNTER — APPOINTMENT (OUTPATIENT)
Dept: CARDIOLOGY | Facility: CLINIC | Age: 50
End: 2025-05-20
Payer: COMMERCIAL

## 2025-05-23 ENCOUNTER — TELEPHONE (OUTPATIENT)
Dept: PAIN MEDICINE | Facility: CLINIC | Age: 50
End: 2025-05-23
Payer: COMMERCIAL

## 2025-05-23 DIAGNOSIS — M54.6 ACUTE MIDLINE THORACIC BACK PAIN: ICD-10-CM

## 2025-05-23 RX ORDER — CYCLOBENZAPRINE HCL 10 MG
10 TABLET ORAL 3 TIMES DAILY PRN
Qty: 21 TABLET | Refills: 0 | Status: SHIPPED | OUTPATIENT
Start: 2025-05-23 | End: 2025-05-30

## 2025-06-20 ENCOUNTER — APPOINTMENT (OUTPATIENT)
Age: 50
End: 2025-06-20
Payer: COMMERCIAL

## 2025-07-04 LAB
ANION GAP SERPL CALCULATED.4IONS-SCNC: 10 MMOL/L (CALC) (ref 7–17)
BUN SERPL-MCNC: 11 MG/DL (ref 7–25)
BUN/CREAT SERPL: ABNORMAL (CALC) (ref 6–22)
CALCIUM SERPL-MCNC: 8.4 MG/DL (ref 8.6–10.2)
CHLORIDE SERPL-SCNC: 101 MMOL/L (ref 98–110)
CHOLEST SERPL-MCNC: 161 MG/DL
CHOLEST/HDLC SERPL: 3.4 (CALC)
CO2 SERPL-SCNC: 28 MMOL/L (ref 20–32)
CREAT SERPL-MCNC: 0.74 MG/DL (ref 0.5–0.99)
EGFRCR SERPLBLD CKD-EPI 2021: 100 ML/MIN/1.73M2
EST. AVERAGE GLUCOSE BLD GHB EST-MCNC: 120 MG/DL
EST. AVERAGE GLUCOSE BLD GHB EST-SCNC: 6.6 MMOL/L
GLUCOSE SERPL-MCNC: 96 MG/DL (ref 65–99)
HBA1C MFR BLD: 5.8 %
HDLC SERPL-MCNC: 48 MG/DL
LDLC SERPL CALC-MCNC: 94 MG/DL (CALC)
NONHDLC SERPL-MCNC: 113 MG/DL (CALC)
POTASSIUM SERPL-SCNC: 4 MMOL/L (ref 3.5–5.3)
SODIUM SERPL-SCNC: 139 MMOL/L (ref 135–146)
TRIGL SERPL-MCNC: 96 MG/DL

## 2025-07-10 ENCOUNTER — APPOINTMENT (OUTPATIENT)
Age: 50
End: 2025-07-10
Payer: COMMERCIAL

## 2025-07-10 VITALS
HEIGHT: 71 IN | DIASTOLIC BLOOD PRESSURE: 82 MMHG | HEART RATE: 90 BPM | OXYGEN SATURATION: 96 % | SYSTOLIC BLOOD PRESSURE: 136 MMHG | BODY MASS INDEX: 41.02 KG/M2 | WEIGHT: 293 LBS

## 2025-07-10 DIAGNOSIS — M54.6 ACUTE MIDLINE THORACIC BACK PAIN: ICD-10-CM

## 2025-07-10 DIAGNOSIS — M25.562 CHRONIC PAIN OF BOTH KNEES: ICD-10-CM

## 2025-07-10 DIAGNOSIS — M54.16 LUMBAR RADICULOPATHY: ICD-10-CM

## 2025-07-10 DIAGNOSIS — G89.29 CHRONIC PAIN OF BOTH KNEES: ICD-10-CM

## 2025-07-10 DIAGNOSIS — Z12.31 ENCOUNTER FOR SCREENING MAMMOGRAM FOR MALIGNANT NEOPLASM OF BREAST: Primary | ICD-10-CM

## 2025-07-10 DIAGNOSIS — M79.18 DIFFUSE MYOFASCIAL PAIN SYNDROME: ICD-10-CM

## 2025-07-10 DIAGNOSIS — E78.5 HYPERLIPIDEMIA LDL GOAL <70: ICD-10-CM

## 2025-07-10 DIAGNOSIS — Z12.11 ENCOUNTER FOR SCREENING FOR MALIGNANT NEOPLASM OF COLON: ICD-10-CM

## 2025-07-10 DIAGNOSIS — R60.9 EDEMA, UNSPECIFIED TYPE: ICD-10-CM

## 2025-07-10 DIAGNOSIS — Z86.69 HISTORY OF CHIARI MALFORMATION: ICD-10-CM

## 2025-07-10 DIAGNOSIS — M25.561 CHRONIC PAIN OF BOTH KNEES: ICD-10-CM

## 2025-07-10 DIAGNOSIS — R73.03 PREDIABETES: ICD-10-CM

## 2025-07-10 DIAGNOSIS — E23.7 PITUITARY ABNORMALITY (MULTI): ICD-10-CM

## 2025-07-10 PROBLEM — R53.83 OTHER FATIGUE: Status: RESOLVED | Noted: 2023-03-03 | Resolved: 2025-07-10

## 2025-07-10 PROCEDURE — 1036F TOBACCO NON-USER: CPT | Performed by: INTERNAL MEDICINE

## 2025-07-10 PROCEDURE — 99214 OFFICE O/P EST MOD 30 MIN: CPT | Performed by: INTERNAL MEDICINE

## 2025-07-10 PROCEDURE — 3008F BODY MASS INDEX DOCD: CPT | Performed by: INTERNAL MEDICINE

## 2025-07-10 RX ORDER — ATORVASTATIN CALCIUM 20 MG/1
20 TABLET, FILM COATED ORAL NIGHTLY
Qty: 100 TABLET | Refills: 1 | Status: SHIPPED | OUTPATIENT
Start: 2025-07-10

## 2025-07-10 RX ORDER — DULOXETIN HYDROCHLORIDE 30 MG/1
60 CAPSULE, DELAYED RELEASE ORAL DAILY
Qty: 200 CAPSULE | Refills: 1 | Status: SHIPPED | OUTPATIENT
Start: 2025-07-10 | End: 2026-07-10

## 2025-07-10 RX ORDER — FUROSEMIDE 40 MG/1
60 TABLET ORAL DAILY
Qty: 150 TABLET | Refills: 1 | Status: SHIPPED | OUTPATIENT
Start: 2025-07-10

## 2025-07-10 RX ORDER — CYCLOBENZAPRINE HCL 10 MG
10 TABLET ORAL 3 TIMES DAILY PRN
Qty: 21 TABLET | Refills: 0 | Status: SHIPPED | OUTPATIENT
Start: 2025-07-10 | End: 2025-07-17

## 2025-07-10 ASSESSMENT — ENCOUNTER SYMPTOMS
DIARRHEA: 0
SHORTNESS OF BREATH: 0
FATIGUE: 0
PALPITATIONS: 0
COUGH: 0
ARTHRALGIAS: 0
VOMITING: 0
NAUSEA: 0
BLOOD IN STOOL: 0
WHEEZING: 0
ABDOMINAL PAIN: 0
BACK PAIN: 0

## 2025-07-10 NOTE — PATIENT INSTRUCTIONS
Patient instructions  As we discussed you will be receiving a Cologuard kit in the mail and please follow the directions.  Once the results are known we will contact you  Please also remember to schedule your screening mammogram for next month  Please check your blood pressures at home when you have the opportunity to sit relax for 10 to 20 minutes to give me an update after a couple of weeks  Please keep up the fantastic work with your weight loss  We will see you back in 6 months and please remember to get fasting lab work done prior to that visit

## 2025-07-10 NOTE — ASSESSMENT & PLAN NOTE
- Her hemoglobin A1c was borderline at 5.8 and we will continue to monitor  -She is doing a fantastic job with weight loss

## 2025-07-10 NOTE — ASSESSMENT & PLAN NOTE
- Her cholesterol profile is good and we will check this once a year   Current mood episode/Anhedonia/Agitation/Severe Anxiety/Panic Hopelessness or despair/Mood Disorder current/past/Current mood episode/Psychotic disorder current/past/Anhedonia/Agitation/Severe Anxiety/Panic

## 2025-07-10 NOTE — ASSESSMENT & PLAN NOTE
- She was diagnosed with a pituitary issue over a decade ago and did see a neurologist.  She was advised that if she develops symptoms such as chronic headaches to be reassessed.  She understands this and will let us know if that occurs

## 2025-07-10 NOTE — PROGRESS NOTES
Subjective   Patient ID: Rossi Genao is a 50 y.o. female who presents for Follow-up (6 MO CK).  HPI  She is here today for her routine checkup.  When she arrived her blood pressure was a bit generous but after sitting for a while it came down a few points.  Normally her blood pressure is very good.  She states she is just on the tail end of a cold and she has been taking over-the-counter decongestant.  She understands that this can elevate blood pressure.  She has agreed to check her blood pressure in the next several weeks and give me an update.  We also briefly discussed her history of headaches as she had pretty significant headaches about a decade ago.  At the time she had an evaluation which revealed a pituitary abnormality and a Chiari malformation.  She saw the neurologist and they felt that her condition was not severe.  Her headaches have resolved.  She recalls being advised that if she develops symptoms such as headaches again she would need to be reevaluated.  She will let us know if that happens.  We also took this opportunity to discuss screening for cancer and she is due for her mammogram next month.  We also talked about the new colon cancer screening guidelines.  She does not have a family history of colon cancer.  She has chosen to do the Cologuard.  We also reviewed her most recent laboratory test results and for the most part her numbers look good.  Her hemoglobin A1c was slightly raised at 5.8.  Her cholesterol profile was relatively good.  Her kidney function is normal.  I am extremely pleased as she is lost approximately 25 pounds and has been working very hard with diet and weight loss.  She occasionally has left shoulder discomfort but she explains it works she does a lot with her arms and she thinks is because of work activity.  She states is not really severe and at least at this point she feels like she can live with it.  I will summarize everything in a problem based format and we are  giving her refills on everything today.  She no longer requires a blood thinner as she has been treated for her phlebitis and doing well.  Review of Systems   Constitutional:  Negative for fatigue.   Respiratory:  Negative for cough, shortness of breath and wheezing.    Cardiovascular:  Negative for chest pain, palpitations and leg swelling.   Gastrointestinal:  Negative for abdominal pain, blood in stool, diarrhea, nausea and vomiting.   Musculoskeletal:  Negative for arthralgias and back pain.     Objective   Physical Exam  Vitals and nursing note reviewed.   Constitutional:       General: She is not in acute distress.     Appearance: Normal appearance.   HENT:      Head: Normocephalic and atraumatic.   Eyes:      Conjunctiva/sclera: Conjunctivae normal.   Cardiovascular:      Rate and Rhythm: Normal rate and regular rhythm.      Heart sounds: Normal heart sounds.   Pulmonary:      Effort: No respiratory distress.      Breath sounds: No wheezing.   Abdominal:      Palpations: Abdomen is soft.      Tenderness: There is no abdominal tenderness. There is no guarding.   Musculoskeletal:         General: No swelling. Normal range of motion.   Skin:     General: Skin is warm and dry.   Neurological:      General: No focal deficit present.      Mental Status: She is alert and oriented to person, place, and time.   Psychiatric:         Behavior: Behavior normal.       Recent Results (from the past 5 weeks)   Lipid Panel    Collection Time: 07/03/25  9:15 AM   Result Value Ref Range    CHOLESTEROL, TOTAL 161 <200 mg/dL    HDL CHOLESTEROL 48 (L) > OR = 50 mg/dL    TRIGLYCERIDES 96 <150 mg/dL    LDL-CHOLESTEROL 94 mg/dL (calc)    CHOL/HDLC RATIO 3.4 <5.0 (calc)    NON HDL CHOLESTEROL 113 <130 mg/dL (calc)   Basic Metabolic Panel    Collection Time: 07/03/25  9:15 AM   Result Value Ref Range    GLUCOSE 96 65 - 99 mg/dL    UREA NITROGEN (BUN) 11 7 - 25 mg/dL    CREATININE 0.74 0.50 - 0.99 mg/dL    EGFR 100 > OR = 60  mL/min/1.73m2    BUN/CREATININE RATIO SEE NOTE: 6 - 22 (calc)    SODIUM 139 135 - 146 mmol/L    POTASSIUM 4.0 3.5 - 5.3 mmol/L    CHLORIDE 101 98 - 110 mmol/L    CARBON DIOXIDE 28 20 - 32 mmol/L    ELECTROLYTE BALANCE 10 7 - 17 mmol/L (calc)    CALCIUM 8.4 (L) 8.6 - 10.2 mg/dL   Hemoglobin A1C    Collection Time: 07/03/25  9:15 AM   Result Value Ref Range    HEMOGLOBIN A1c 5.8 (H) <5.7 %    eAG (mg/dL) 120 mg/dL    eAG (mmol/L) 6.6 mmol/L       Assessment/Plan   Problem List Items Addressed This Visit           ICD-10-CM    Pituitary abnormality (Multi) E23.7    - She was diagnosed with a pituitary issue over a decade ago and did see a neurologist.  She was advised that if she develops symptoms such as chronic headaches to be reassessed.  She understands this and will let us know if that occurs         Hyperlipidemia LDL goal <70 E78.5    - Her cholesterol profile is good and we will check this once a year         Relevant Medications    atorvastatin (Lipitor) 20 mg tablet    Edema R60.9    Relevant Medications    furosemide (Lasix) 40 mg tablet    Prediabetes R73.03    - Her hemoglobin A1c was borderline at 5.8 and we will continue to monitor  -She is doing a fantastic job with weight loss         Relevant Orders    Basic Metabolic Panel    Hemoglobin A1C    History of Chiari malformation Z86.69    - She is asymptomatic         Chronic pain of both knees M25.561, M25.562, G89.29    Relevant Medications    DULoxetine (Cymbalta) 30 mg DR capsule    RESOLVED: Acute midline thoracic back pain M54.6    Relevant Medications    cyclobenzaprine (Flexeril) 10 mg tablet    Lumbar radiculopathy M54.16    Relevant Medications    DULoxetine (Cymbalta) 30 mg DR capsule    Diffuse myofascial pain syndrome M79.18    Relevant Medications    DULoxetine (Cymbalta) 30 mg DR capsule    Encounter for screening for malignant neoplasm of colon - Primary Z12.11    - We discussed the various ways to screen for colon cancer and she has  chosen Cologuard         Relevant Orders    Cologuard® colon cancer screening   Patient instructions  As we discussed you will be receiving a Cologuard kit in the mail and please follow the directions.  Once the results are known we will contact you  Please also remember to schedule your screening mammogram for next month  Please check your blood pressures at home when you have the opportunity to sit relax for 10 to 20 minutes to give me an update after a couple of weeks  Please keep up the fantastic work with your weight loss  We will see you back in 6 months and please remember to get fasting lab work done prior to that visit       Dorys Cross, DO

## 2025-07-31 ENCOUNTER — RESULTS FOLLOW-UP (OUTPATIENT)
Age: 50
End: 2025-07-31
Payer: COMMERCIAL

## 2025-07-31 LAB — NONINV COLON CA DNA+OCC BLD SCRN STL QL: NEGATIVE

## 2025-08-07 ENCOUNTER — APPOINTMENT (OUTPATIENT)
Dept: PAIN MEDICINE | Facility: CLINIC | Age: 50
End: 2025-08-07
Payer: COMMERCIAL

## 2025-08-13 ENCOUNTER — APPOINTMENT (OUTPATIENT)
Dept: PAIN MEDICINE | Facility: CLINIC | Age: 50
End: 2025-08-13
Payer: COMMERCIAL

## 2025-08-15 ENCOUNTER — APPOINTMENT (OUTPATIENT)
Dept: OBSTETRICS AND GYNECOLOGY | Facility: CLINIC | Age: 50
End: 2025-08-15
Payer: COMMERCIAL

## 2025-08-27 ENCOUNTER — OFFICE VISIT (OUTPATIENT)
Dept: PAIN MEDICINE | Facility: CLINIC | Age: 50
End: 2025-08-27
Payer: COMMERCIAL

## 2025-08-27 VITALS
WEIGHT: 293 LBS | RESPIRATION RATE: 16 BRPM | HEART RATE: 80 BPM | DIASTOLIC BLOOD PRESSURE: 70 MMHG | BODY MASS INDEX: 49.12 KG/M2 | SYSTOLIC BLOOD PRESSURE: 104 MMHG

## 2025-08-27 DIAGNOSIS — M25.562 CHRONIC PAIN OF BOTH KNEES: ICD-10-CM

## 2025-08-27 DIAGNOSIS — M47.816 LUMBAR SPONDYLOSIS: ICD-10-CM

## 2025-08-27 DIAGNOSIS — M25.561 CHRONIC PAIN OF BOTH KNEES: ICD-10-CM

## 2025-08-27 DIAGNOSIS — M54.6 ACUTE MIDLINE THORACIC BACK PAIN: ICD-10-CM

## 2025-08-27 DIAGNOSIS — M79.18 DIFFUSE MYOFASCIAL PAIN SYNDROME: Primary | ICD-10-CM

## 2025-08-27 DIAGNOSIS — M54.16 LUMBAR RADICULOPATHY: ICD-10-CM

## 2025-08-27 DIAGNOSIS — G89.29 CHRONIC PAIN OF BOTH KNEES: ICD-10-CM

## 2025-08-27 PROCEDURE — 99214 OFFICE O/P EST MOD 30 MIN: CPT

## 2025-08-27 RX ORDER — CYCLOBENZAPRINE HCL 10 MG
10 TABLET ORAL 3 TIMES DAILY PRN
Qty: 90 TABLET | Refills: 0 | Status: SHIPPED | OUTPATIENT
Start: 2025-08-27

## 2025-08-27 ASSESSMENT — ENCOUNTER SYMPTOMS
CARDIOVASCULAR NEGATIVE: 1
GASTROINTESTINAL NEGATIVE: 1
EYES NEGATIVE: 1
PSYCHIATRIC NEGATIVE: 1
ALLERGIC/IMMUNOLOGIC NEGATIVE: 1
WEAKNESS: 1
ENDOCRINE NEGATIVE: 1
BACK PAIN: 1
NUMBNESS: 1
MYALGIAS: 1
CONSTITUTIONAL NEGATIVE: 1
ARTHRALGIAS: 1
RESPIRATORY NEGATIVE: 1
HEMATOLOGIC/LYMPHATIC NEGATIVE: 1

## 2025-09-03 ENCOUNTER — APPOINTMENT (OUTPATIENT)
Dept: CARDIOLOGY | Facility: CLINIC | Age: 50
End: 2025-09-03
Payer: COMMERCIAL

## 2025-09-05 ENCOUNTER — APPOINTMENT (OUTPATIENT)
Dept: CARDIOLOGY | Facility: CLINIC | Age: 50
End: 2025-09-05
Payer: COMMERCIAL

## 2026-01-16 ENCOUNTER — APPOINTMENT (OUTPATIENT)
Age: 51
End: 2026-01-16
Payer: COMMERCIAL

## 2026-01-19 ENCOUNTER — APPOINTMENT (OUTPATIENT)
Age: 51
End: 2026-01-19
Payer: COMMERCIAL